# Patient Record
Sex: FEMALE | Race: WHITE | NOT HISPANIC OR LATINO | Employment: UNEMPLOYED | URBAN - NONMETROPOLITAN AREA
[De-identification: names, ages, dates, MRNs, and addresses within clinical notes are randomized per-mention and may not be internally consistent; named-entity substitution may affect disease eponyms.]

---

## 2022-08-17 ENCOUNTER — TELEPHONE (OUTPATIENT)
Dept: BARIATRICS/WEIGHT MGMT | Facility: CLINIC | Age: 27
End: 2022-08-17

## 2022-08-17 NOTE — TELEPHONE ENCOUNTER
Patient was called to remind them of their new patient appointment and to bring completed paperwork or arrive 30 minutes early. The patient was agreeable and voiced an understanding.       Patient has completed paperwork and [id number]

## 2022-08-18 ENCOUNTER — OFFICE VISIT (OUTPATIENT)
Dept: BARIATRICS/WEIGHT MGMT | Facility: CLINIC | Age: 27
End: 2022-08-18

## 2022-08-18 VITALS
HEART RATE: 71 BPM | OXYGEN SATURATION: 98 % | HEIGHT: 60 IN | SYSTOLIC BLOOD PRESSURE: 133 MMHG | DIASTOLIC BLOOD PRESSURE: 85 MMHG | TEMPERATURE: 98.6 F | WEIGHT: 269.4 LBS | BODY MASS INDEX: 52.89 KG/M2

## 2022-08-18 DIAGNOSIS — E66.01 CLASS 3 SEVERE OBESITY DUE TO EXCESS CALORIES WITH SERIOUS COMORBIDITY AND BODY MASS INDEX (BMI) OF 50.0 TO 59.9 IN ADULT: Primary | ICD-10-CM

## 2022-08-18 DIAGNOSIS — R12 HEARTBURN: ICD-10-CM

## 2022-08-18 PROCEDURE — 99204 OFFICE O/P NEW MOD 45 MIN: CPT | Performed by: NURSE PRACTITIONER

## 2022-08-18 RX ORDER — MULTIPLE VITAMINS W/ MINERALS TAB 9MG-400MCG
1 TAB ORAL DAILY
COMMUNITY
End: 2023-01-13

## 2022-08-18 RX ORDER — CALCIUM CARBONATE 200(500)MG
1 TABLET,CHEWABLE ORAL DAILY
COMMUNITY
End: 2023-01-13

## 2022-08-18 RX ORDER — FAMOTIDINE 10 MG
10 TABLET ORAL 2 TIMES DAILY
COMMUNITY
End: 2022-12-12

## 2022-08-18 NOTE — PROGRESS NOTES
Patient Care Team:  Lauren Riddle APRN as PCP - General (Family Medicine)      Subjective     Patient is a 26 y.o. female presents with morbid obesity and her Body mass index is 52.61 kg/m².     She is here for discussion of surgical weight loss options.  She stated she has been with the disease of obesity for year(s).  She stated she suffers from GERD, fatigue and morbid obesity due to her weight gain.  She stated that weight loss helps alleviate these symptoms.   She stated that she has tried other diet regimens such as KETO to help with weight loss.  She stated that she has attempted these conservative methods for weight loss without maintaining long term success.  Today she would like to discuss surgical weight loss options such as the Laparoscopic Sleeve Gastrectomy or the Laparoscopic R - Y Gastric Bypass.     She admits to struggling with her weight for at least 17 years.  She states that she has been around 200 pounds more overweight for the past 10 years.  She states that she was able to lose around 76 pounds by doing keto but was able to maintain that for about 3 months.    Review of Systems   Constitutional: Positive for fatigue.   Respiratory: Negative.    Cardiovascular: Negative.    Gastrointestinal: Negative.    Endocrine: Negative.    Musculoskeletal: Positive for arthralgias and back pain.   Psychiatric/Behavioral: Negative.         History  Past Medical History:   Diagnosis Date   • Heartburn       Past Surgical History:   Procedure Laterality Date   • BARTHOLIN GLAND CYST EXCISION  2018   • CHOLECYSTECTOMY     • SALPINGECTOMY Bilateral       Social History     Socioeconomic History   • Marital status:    Tobacco Use   • Smoking status: Never Smoker   • Smokeless tobacco: Never Used   Substance and Sexual Activity   • Alcohol use: Never      Family History   Problem Relation Age of Onset   • Hypertension Mother    • Diabetes Mother    • Sleep apnea Father    • No Known Problems  Sister    • SIDS Sister    • SIDS Sister    • No Known Problems Brother    • Obesity Maternal Grandmother    • Obesity Paternal Grandmother    • Obesity Paternal Grandfather    • Diabetes Brother       No Known Allergies       Current Outpatient Medications:   •  calcium carbonate (TUMS) 500 MG chewable tablet, Chew 1 tablet Daily., Disp: , Rfl:   •  famotidine (PEPCID) 10 MG tablet, Take 10 mg by mouth 2 (Two) Times a Day., Disp: , Rfl:   •  multivitamin with minerals (MULTIVITAMIN ADULT PO), Take 1 tablet by mouth Daily., Disp: , Rfl:     Objective     Vital Signs  Temp:  [98.6 °F (37 °C)] 98.6 °F (37 °C)  Heart Rate:  [71] 71  BP: (133)/(85) 133/85  Body mass index is 52.61 kg/m².      08/18/22  0858   Weight: 122 kg (269 lb 6.4 oz)       Physical Exam  Vitals reviewed.   Constitutional:       Appearance: She is obese.   Cardiovascular:      Rate and Rhythm: Normal rate and regular rhythm.   Pulmonary:      Effort: Pulmonary effort is normal.   Abdominal:      General: Bowel sounds are normal.      Palpations: Abdomen is soft.   Musculoskeletal:         General: Normal range of motion.   Skin:     General: Skin is warm and dry.   Neurological:      Mental Status: She is alert and oriented to person, place, and time.   Psychiatric:         Mood and Affect: Mood normal.         Behavior: Behavior normal.            Results Review:   I reviewed the patient's new clinical results.      Class 3 Severe Obesity (BMI >=40). Obesity-related health conditions include the following: fatigue and GERD. Obesity is unchanged. BMI is is above average; BMI management plan is completed. We discussed portion control and increasing exercise.      Assessment & Plan   Diagnoses and all orders for this visit:    1. Class 3 severe obesity due to excess calories with serious comorbidity and body mass index (BMI) of 50.0 to 59.9 in adult ([facility]) (Primary)  Comments:  Meal plan prescribed and reviewed with patient today.  Preoperative  work-up needs reviewed with patient today.  Disease of obesity explained to patien  Assessment & Plan:  Patient's (Body mass index is 52.61 kg/m².) indicates that they are morbidly obese (BMI > 40 or > 35 with obesity - related health condition) with health conditions that include GERD . Weight is improving with treatment. BMI is is above average; BMI management plan is completed. We discussed portion control and increasing exercise.       2. Heartburn  Comments:  Continue Pepcid.  EGD will be ordered at later date.  Nutritional counseling provided.        I discussed the patient's findings and my recommendations with patient.     I have also recommended that she obtain a cardiac risk assessment and psychiatric evaluation prior to surgery consideration.    She has been provided a structured dietary regimen based off of her behavior.  I discussed with the patient the etiology of the disease of obesity and the potential comorbid conditions associated with this disease.  She was instructed to follow the dietary regimen and follow-up with our program in 1 month's time with any additional questions as they may arise during this time.  We emphasized on focusing on proteins and meals high in fiber as well as adequate hydration that exceed 64 ounces of water daily.    I explained that I anticipate the patient to lose weight prior to her next monthly visit.  I encouraged patient to have a reward day once a month.    1. Patient is a 26 y.o. female who has morbid obesity with Body mass index is 52.61 kg/m². and desires surgical weight loss. Patient has been advised that this surgery is considered to be elective major surgery that is typically done laparoscopically. Patient is a potentially good surgical candidate. Patient will need to meet with the Bariatric Surgeon to further discuss surgical options. Patient has been advised that they will need to have a work-up prior to surgery. This work-up will include but is not limited  to an EKG, an EGD to assess for H. Pylori and Odom's esophagus, and a psychological evaluation, with additional testing as necessary. Pre-op testing will be ordered at next visit. Today the patient  received the 4 meals/day diet prescription, which was explained to patient.  Patient will see the dietitian today to further discuss goals for diet, exercise, and lifestyle. Patient has received intensive behavioral therapy for obesity today. I explained the pathophysiology of the disease and its storage component. We also discussed Dr. Dominique' pearls of the program. Nutrition counseling ordered today.     Pre-op testing:     Seminar - Completed  EGD - Needed, but not yet ordered  H. Pylori - Will be done with EGD   H. Pylori Stool -  N/A    Psychological Evaluation - Needed, but not yet ordered    EKG - Needed, but not yet ordered    Cardiology - If EKG abnormal, cardiology will be ordered     TSH - Needed, but not yet ordered  Nicotine - N/A    Pulmonary Clearance - N/A   Drug Tests - N/A    Dietitian - needs    A total of 45 minutes was spent reviewing documents, charting, educating and face to face with this patient and over half of the time was spent on counseling and coordination of care for the disease of obesity. We specifically reviewed the dietary prescription and I made recommendations toward increasing exercise as tolerated as well as focusing on training their behavior toward storing less.    JERROD Joel     08/18/22  13:01 CDT

## 2022-08-18 NOTE — ASSESSMENT & PLAN NOTE
Patient's (Body mass index is 52.61 kg/m².) indicates that they are morbidly obese (BMI > 40 or > 35 with obesity - related health condition) with health conditions that include GERD . Weight is improving with treatment. BMI is is above average; BMI management plan is completed. We discussed portion control and increasing exercise.

## 2022-08-22 ENCOUNTER — TELEPHONE (OUTPATIENT)
Dept: BARIATRICS/WEIGHT MGMT | Facility: CLINIC | Age: 27
End: 2022-08-22

## 2022-08-22 NOTE — TELEPHONE ENCOUNTER
Patient called with several questions regarding the prescription meal plan I answered many of her questions but Madeleine will call her later to answer her other questions.

## 2022-08-23 ENCOUNTER — PATIENT MESSAGE (OUTPATIENT)
Dept: BARIATRICS/WEIGHT MGMT | Facility: CLINIC | Age: 27
End: 2022-08-23

## 2022-08-23 ENCOUNTER — TELEPHONE (OUTPATIENT)
Dept: BARIATRICS/WEIGHT MGMT | Facility: CLINIC | Age: 27
End: 2022-08-23

## 2022-08-23 NOTE — TELEPHONE ENCOUNTER
Follow up call with Patient after her first visit. Reviewed meal plan and answered all Pt questions at this time. Encouraged her progress and advised her to continue to reach out if questions arise.

## 2022-09-19 ENCOUNTER — OFFICE VISIT (OUTPATIENT)
Dept: BARIATRICS/WEIGHT MGMT | Facility: CLINIC | Age: 27
End: 2022-09-19

## 2022-09-19 VITALS
HEIGHT: 60 IN | WEIGHT: 264.4 LBS | SYSTOLIC BLOOD PRESSURE: 126 MMHG | OXYGEN SATURATION: 94 % | DIASTOLIC BLOOD PRESSURE: 82 MMHG | BODY MASS INDEX: 51.91 KG/M2 | TEMPERATURE: 98 F | HEART RATE: 76 BPM

## 2022-09-19 DIAGNOSIS — R12 HEARTBURN: ICD-10-CM

## 2022-09-19 DIAGNOSIS — G47.10 HYPERSOMNIA: ICD-10-CM

## 2022-09-19 DIAGNOSIS — E66.01 CLASS 3 SEVERE OBESITY DUE TO EXCESS CALORIES WITH SERIOUS COMORBIDITY AND BODY MASS INDEX (BMI) OF 50.0 TO 59.9 IN ADULT: Primary | ICD-10-CM

## 2022-09-19 PROCEDURE — 99214 OFFICE O/P EST MOD 30 MIN: CPT | Performed by: NURSE PRACTITIONER

## 2022-09-19 NOTE — PROGRESS NOTES
"Metabolic and Bariatric Surgery Adult Nutrition Assessment    Patient Name: Jennifer Antnoio   YOB: 1995   MRN: [id number]     Assessment Date:  09/19/2022     Reason for Visit: Initial Nutrition Assessment     Treatment Pathway: Preoperative Bariatric Surgery, Visit 2    Assessment    Anthropometrics   Wt Readings from Last 1 Encounters:   09/19/22 120 kg (264 lb 6.4 oz)     Ht Readings from Last 1 Encounters:   09/19/22 152.4 cm (60\")     BMI Readings from Last 1 Encounters:   09/19/22 51.64 kg/m²        Initial Weight/Date: 269.4 lbs (Aug 2022)  Weight Changes since last visit: -5 lbs  Net Weight Change: -5 lbs    Past Medical History:   Diagnosis Date   • Heartburn       Past Surgical History:   Procedure Laterality Date   • BARTHOLIN GLAND CYST EXCISION  2018   • CHOLECYSTECTOMY     • SALPINGECTOMY Bilateral       Current Outpatient Medications   Medication Sig Dispense Refill   • calcium carbonate (TUMS) 500 MG chewable tablet Chew 1 tablet Daily.     • famotidine (PEPCID) 10 MG tablet Take 10 mg by mouth 2 (Two) Times a Day.     • multivitamin with minerals tablet tablet Take 1 tablet by mouth Daily.       No current facility-administered medications for this visit.      No Known Allergies       Motivation for weight loss includes: Wants to have energy and be able to do activities with children.     Pertinent Social/Behavior/Environmental History: Has three children (7, 5, and 15 months). Does get the opportunity to stay at home with children. Sometimes does meal prep and sometimes not.     Nutrition Recall  Eating 3-4 meals daily   (M1) Low carb tortilla with sausage/eggs and peppers/onions and 1 piece Khmer toast.   (M2) Salad (spinach), cucumbers, tuna, low-calorie/0 sugar dressing.   (M3) sometimes a protein shake w/o vegetables. OR nuts with cucumbers.   (M4) varies- depending on what making for family. Usually meat and vegetables. Stir frys a lot.   Snacking - yes, habit of snacking with " children. Goldfish/cassidy crackers.   Monitoring portions- is using measuring cups.   Calculating Protein- ~100 grams daily.   Drinking sugary/carbonated beverages- 1 soda daily. (was drinking several Energy drinks daily).   Fluid Intake- 74+ ounces daily. Water and Gatorade zero.       Success this Month: Weaning down on soda/caf.   Barriers: Cravings. Wanting to snack on leftovers while putting food away.     Exercise: Walking ~30 minutes daily down long driveway.   Recommended increasing physical activity, beyond normal daily habits, gradually working to reach ~30 minutes daily.     Nutrition Intervention  Nutrition education and nutrition coaching for behavior change provided.  Strategies used included Comprehensive education, Motivational Interviewing , Problem Solving, Skill Development for meal planning and Ongoing reinforcement  Review of medical weight loss prescription 4 meal/day plan and reviewed nutritional needs for Preoperative Bariatric Surgery, Visit 2.  Self-monitoring strategies such as keeping a food journal (on paper or electronically) and calculating fluid/protein intake were discussed.    Recommended Diet Changes  Eat 4 meals per day with protein and vegetables at each meal, no carbs after meal 2., Protein goal: 65gms., Eat vegetables first at each meal., Discussed protein guidelines for shakes and bars., Reduce snacking -use foods from free foods list only., Reduce fat, sugar, and/or salt in food choices., Choose more nutrient dense foods., Choose foods with increased fiber., Monitor portion sizes using a food scale and/or measuring cup., Eliminate soda and sugar-sweetened beverages and Increase fluid intake to 64 ounces per day      Goals  1. Put away leftovers/extra food before eating a meal.   2. Use only free foods/vegetables for snacking between meals.  3. Continue to wean carbonation/sugary drinks.     Monitoring/Evaluation Plan  Anticipate follow up per program protocol. Continue  collaboration of care with physician and treatment team.     Electronically signed by  Madeleine Welsh RDN, LD  09/19/2022 10:08 CDT.

## 2022-09-26 ENCOUNTER — HOSPITAL ENCOUNTER (OUTPATIENT)
Dept: CARDIOLOGY | Facility: HOSPITAL | Age: 27
Discharge: HOME | End: 2022-09-26
Admitting: NURSE PRACTITIONER

## 2022-09-26 DIAGNOSIS — E66.01 CLASS 3 SEVERE OBESITY DUE TO EXCESS CALORIES WITH SERIOUS COMORBIDITY AND BODY MASS INDEX (BMI) OF 50.0 TO 59.9 IN ADULT: ICD-10-CM

## 2022-09-26 PROCEDURE — 93010 ELECTROCARDIOGRAM REPORT: CPT | Performed by: INTERNAL MEDICINE

## 2022-09-26 PROCEDURE — 93005 ELECTROCARDIOGRAM TRACING: CPT | Performed by: NURSE PRACTITIONER

## 2022-09-27 LAB
QT INTERVAL: 412 MS
QTC INTERVAL: 441 MS

## 2022-10-17 ENCOUNTER — OFFICE VISIT (OUTPATIENT)
Dept: BARIATRICS/WEIGHT MGMT | Facility: CLINIC | Age: 27
End: 2022-10-17

## 2022-10-17 VITALS
SYSTOLIC BLOOD PRESSURE: 122 MMHG | HEIGHT: 60 IN | WEIGHT: 261 LBS | TEMPERATURE: 98.7 F | DIASTOLIC BLOOD PRESSURE: 84 MMHG | OXYGEN SATURATION: 97 % | HEART RATE: 82 BPM | BODY MASS INDEX: 51.24 KG/M2

## 2022-10-17 DIAGNOSIS — E66.01 CLASS 3 SEVERE OBESITY DUE TO EXCESS CALORIES WITH SERIOUS COMORBIDITY AND BODY MASS INDEX (BMI) OF 50.0 TO 59.9 IN ADULT: Primary | ICD-10-CM

## 2022-10-17 DIAGNOSIS — R12 HEARTBURN: ICD-10-CM

## 2022-10-17 PROCEDURE — 99214 OFFICE O/P EST MOD 30 MIN: CPT | Performed by: SURGERY

## 2022-10-17 NOTE — PROGRESS NOTES
Patient Care Team:  Lauren Riddle APRN as PCP - General (Family Medicine)    Reason for Visit:  Surgical Weight loss      Subjective     Jennifer Antonio is a pleasant 27 y.o. female and presents with morbid obesity with her Body mass index is 50.97 kg/m².    She is here for discussion of the upper endoscopic procedure.  She stated she has been with the disease of obesity for year(s).  She stated she suffers from heartburn and morbid obesity due to her weight gain.  She stated that weight loss helps alleviate these symptoms.   She stated that she has tried multiple diet regimens including participating in a medically supervised weight loss program to help with weight loss.  She stated that she has attempted these conservative methods for weight loss without maintaining long term success.  Today sheand myself will discuss surgical weight loss options such as the Laparoscopic Sleeve Gastrectomy or the Laparoscopic R - Y Gastric Bypass.        Review of Systems  General ROS: negative  Respiratory ROS: no cough, shortness of breath, or wheezing  Cardiovascular ROS: no chest pain or dyspnea on exertion  Gastrointestinal ROS: no abdominal pain, change in bowel habits, or black or bloody stools    History  Past Medical History:   Diagnosis Date   • Heartburn      Past Surgical History:   Procedure Laterality Date   • BARTHOLIN GLAND CYST EXCISION  2018   • CHOLECYSTECTOMY     • SALPINGECTOMY Bilateral      Family History   Problem Relation Age of Onset   • Hypertension Mother    • Diabetes Mother    • Sleep apnea Father    • No Known Problems Sister    • SIDS Sister    • SIDS Sister    • No Known Problems Brother    • Obesity Maternal Grandmother    • Obesity Paternal Grandmother    • Obesity Paternal Grandfather    • Diabetes Brother      Social History     Tobacco Use   • Smoking status: Never   • Smokeless tobacco: Never   Substance Use Topics   • Alcohol use: Never     E-cigarette/Vaping      E-cigarette/Vaping Substances     (Not in a hospital admission)    Allergies:  Patient has no known allergies.      Current Outpatient Medications:   •  calcium carbonate (TUMS) 500 MG chewable tablet, Chew 1 tablet Daily., Disp: , Rfl:   •  famotidine (PEPCID) 10 MG tablet, Take 10 mg by mouth 2 (Two) Times a Day., Disp: , Rfl:   •  multivitamin with minerals tablet tablet, Take 1 tablet by mouth Daily., Disp: , Rfl:     Objective     Vital Signs  Temp:  [98.7 °F (37.1 °C)] 98.7 °F (37.1 °C)  Heart Rate:  [82] 82  BP: (122)/(84) 122/84  Body mass index is 50.97 kg/m².      10/17/22  1306   Weight: 118 kg (261 lb)       Physical Exam:      HEENT: extra ocular movement intact and sclera clear, anicteric  Respiratory: appears well, vitals normal, no respiratory distress, acyanotic, normal RR, chest clear, no wheezing, crepitations, rhonchi, normal symmetric air entry  Cardiovascular: Regular rate and rhythm, S1, S2 normal, no murmur, click, rub or gallop  GI: Soft, non-tender, normal bowel sounds; no bruits, organomegaly or masses.  Abnormal shape: obese  Musculoskeletal: inspection - no abnormality  Neurologic: alert, oriented, normal speech, no focal findings or movement disorder noted       Results Review:   None        Assessment & Plan   Encounter Diagnoses   Name Primary?   • Class 3 severe obesity due to excess calories with serious comorbidity and body mass index (BMI) of 50.0 to 59.9 in adult ([facility]CC) Yes   • Heartburn            1.  I believe this patient will be a good candidate for weight loss surgery.  I have discussed the Maya - Y Gastric Bypass, laparoscopic sleeve gastrectomy and the Laparoscopic Gastric Band procedures.  We discussed the benefits of the surgeries including the benefit of weight loss and the possible reversal of co-morbid conditions associated with morbid obesity. I explained to the patient that prior to making a definitive decision on the type of surgery she will require an  esophagogastroduodenoscopy with biopsies to assess for any contraindications for surgical weight loss.  I explained the alternatives  include not doing anything, or pursuing an UGI series which only offers a diagnosis with potential less accuracy compared to EGD, the benefits of the EGD such as identifying the pathology and anatomy of the upper GI system, and the risks and complications of the endoscopy were discussed in detail as well. I discussed the risk of perforation (one out of [id number], riskier with dilation), bleeding (one out of 500), and the rare risks of infection, adverse reaction to anesthesia, respiratory failure, cardiac failure including MI and adverse reaction to medications such as allergic reactions. We discussed consequences that could occur if a risk were to develop such as the need for hospitalization, blood transfusion, surgical intervention, medications, pain, disability and death. The patient verbalizes understanding and agrees to proceed. such as bleeding, perforation, swallowing difficulties and gas bloat can occur after this procedure.    Upon completion of our discussion and addressing and answering her questions to her satisfation, informed consent was obtained.  She will be scheduled accordingly for the esophagogastroduodenoscopy procedure.    I discussed the patients findings and my recommendations with patient.     Dr. Eloy Dominique MD [facility]    10/17/22  13:09 CDT  Patient Care Team:  Lauren Riddle APRN as PCP - General (Family Medicine)

## 2022-10-24 ENCOUNTER — HOSPITAL ENCOUNTER (OUTPATIENT)
Dept: SLEEP MEDICINE | Facility: HOSPITAL | Age: 27
Discharge: HOME | End: 2022-10-24
Admitting: NURSE PRACTITIONER

## 2022-10-24 DIAGNOSIS — G47.10 HYPERSOMNIA: ICD-10-CM

## 2022-10-24 DIAGNOSIS — E66.01 CLASS 3 SEVERE OBESITY DUE TO EXCESS CALORIES WITH SERIOUS COMORBIDITY AND BODY MASS INDEX (BMI) OF 50.0 TO 59.9 IN ADULT: ICD-10-CM

## 2022-10-24 PROCEDURE — 95800 SLP STDY UNATTENDED: CPT | Performed by: INTERNAL MEDICINE

## 2022-10-24 PROCEDURE — 95800 SLP STDY UNATTENDED: CPT

## 2022-11-02 ENCOUNTER — TELEPHONE (OUTPATIENT)
Dept: BARIATRICS/WEIGHT MGMT | Facility: CLINIC | Age: 27
End: 2022-11-02

## 2022-11-02 NOTE — TELEPHONE ENCOUNTER
BA-EGD       Patient was called and reminded of their procedure with Dr Dominique on 11/04/2022        Instructions:     1) Eat normal the day before your procedure until 8 p.m. in the evening.    2) Beginning at 8pm clear liquids only-no Red or Pink in Color   3) Nothing to eat or drink after midnight.  4) Bring a list of medications.   5) Advised that they must bring a  as that IV sedation is being used.           The patient voiced an understanding and was agreeable.

## 2022-11-04 ENCOUNTER — ANESTHESIA EVENT (OUTPATIENT)
Dept: GASTROENTEROLOGY | Facility: HOSPITAL | Age: 27
End: 2022-11-04

## 2022-11-04 ENCOUNTER — HOSPITAL ENCOUNTER (OUTPATIENT)
Facility: HOSPITAL | Age: 27
Setting detail: HOSPITAL OUTPATIENT SURGERY
Discharge: HOME | End: 2022-11-04
Attending: SURGERY | Admitting: SURGERY

## 2022-11-04 ENCOUNTER — ANESTHESIA (OUTPATIENT)
Dept: GASTROENTEROLOGY | Facility: HOSPITAL | Age: 27
End: 2022-11-04

## 2022-11-04 VITALS
BODY MASS INDEX: 51.24 KG/M2 | RESPIRATION RATE: 22 BRPM | WEIGHT: 261 LBS | OXYGEN SATURATION: 97 % | HEART RATE: 75 BPM | TEMPERATURE: 97.4 F | HEIGHT: 60 IN | SYSTOLIC BLOOD PRESSURE: 105 MMHG | DIASTOLIC BLOOD PRESSURE: 68 MMHG

## 2022-11-04 DIAGNOSIS — E66.01 CLASS 3 SEVERE OBESITY DUE TO EXCESS CALORIES WITH SERIOUS COMORBIDITY AND BODY MASS INDEX (BMI) OF 50.0 TO 59.9 IN ADULT: ICD-10-CM

## 2022-11-04 DIAGNOSIS — R12 HEARTBURN: ICD-10-CM

## 2022-11-04 LAB — B-HCG UR QL: NEGATIVE

## 2022-11-04 PROCEDURE — 81025 URINE PREGNANCY TEST: CPT | Performed by: ANESTHESIOLOGY

## 2022-11-04 PROCEDURE — 87081 CULTURE SCREEN ONLY: CPT | Performed by: SURGERY

## 2022-11-04 PROCEDURE — 25010000002 PROPOFOL 10 MG/ML EMULSION: Performed by: NURSE ANESTHETIST, CERTIFIED REGISTERED

## 2022-11-04 RX ORDER — SODIUM CHLORIDE 9 MG/ML
500 INJECTION, SOLUTION INTRAVENOUS CONTINUOUS PRN
Status: DISCONTINUED | OUTPATIENT
Start: 2022-11-04 | End: 2022-11-04 | Stop reason: HOSPADM

## 2022-11-04 RX ORDER — LIDOCAINE HYDROCHLORIDE 10 MG/ML
0.5 INJECTION, SOLUTION EPIDURAL; INFILTRATION; INTRACAUDAL; PERINEURAL ONCE AS NEEDED
Status: CANCELLED | OUTPATIENT
Start: 2022-11-04

## 2022-11-04 RX ORDER — LIDOCAINE HYDROCHLORIDE 20 MG/ML
INJECTION, SOLUTION EPIDURAL; INFILTRATION; INTRACAUDAL; PERINEURAL AS NEEDED
Status: DISCONTINUED | OUTPATIENT
Start: 2022-11-04 | End: 2022-11-04 | Stop reason: SURG

## 2022-11-04 RX ORDER — PROPOFOL 10 MG/ML
VIAL (ML) INTRAVENOUS AS NEEDED
Status: DISCONTINUED | OUTPATIENT
Start: 2022-11-04 | End: 2022-11-04 | Stop reason: SURG

## 2022-11-04 RX ORDER — SODIUM CHLORIDE 0.9 % (FLUSH) 0.9 %
10 SYRINGE (ML) INJECTION AS NEEDED
Status: DISCONTINUED | OUTPATIENT
Start: 2022-11-04 | End: 2022-11-04 | Stop reason: HOSPADM

## 2022-11-04 RX ADMIN — SODIUM CHLORIDE 500 ML: 9 INJECTION, SOLUTION INTRAVENOUS at 10:49

## 2022-11-04 RX ADMIN — PROPOFOL 50 MG: 10 INJECTION, EMULSION INTRAVENOUS at 11:26

## 2022-11-04 RX ADMIN — PROPOFOL 50 MG: 10 INJECTION, EMULSION INTRAVENOUS at 11:27

## 2022-11-04 RX ADMIN — LIDOCAINE HYDROCHLORIDE 100 MG: 20 INJECTION, SOLUTION EPIDURAL; INFILTRATION; INTRACAUDAL; PERINEURAL at 11:25

## 2022-11-04 RX ADMIN — PROPOFOL 100 MG: 10 INJECTION, EMULSION INTRAVENOUS at 11:25

## 2022-11-04 NOTE — ANESTHESIA POSTPROCEDURE EVALUATION
"Patient: Jennifer Antonio    Procedure Summary     Date: 11/04/22 Room / Location: [facility] ENDOSCOPY 2 / [facility] END[facility]    Anesthesia Start: 1124 Anesthesia Stop: 1134    Procedure: ESOPHAGOGASTRODUODENOSCOPY WITH ANESTHESIA Diagnosis:       Class 3 severe obesity due to excess calories with serious comorbidity and body mass index (BMI) of 50.0 to 59.9 in adult ([facility])      Heartburn      (Class 3 severe obesity due to excess calories with serious comorbidity and body mass index (BMI) of 50.0 to 59.9 in adult (HCC) [E66.01, Z68.43])      (Heartburn [R12])    Surgeons: Eloy Dominique MD Provider: Anibal Lewis CRNA    Anesthesia Type: MAC ASA Status: 3          Anesthesia Type: MAC    Vitals  Vitals Value Taken Time   /69 11/04/22 1146   Temp     Pulse 73 11/04/22 1147   Resp 25 11/04/22 1135   SpO2 98 % 11/04/22 1147   Vitals shown include unvalidated device data.        Post Anesthesia Care and Evaluation    Patient location during evaluation: PACU  Patient participation: complete - patient participated  Level of consciousness: awake and alert  Pain management: adequate    Airway patency: patent  Anesthetic complications: No anesthetic complications    Cardiovascular status: acceptable  Respiratory status: acceptable  Hydration status: acceptable    Comments: Blood pressure 101/57, pulse 65, temperature 97.4 °F (36.3 °C), temperature source Temporal, resp. rate 25, height 152.4 cm (60\"), weight 118 kg (261 lb), SpO2 92 %.    Pt discharged from PACU based on nicole score >8      "

## 2022-11-04 NOTE — ANESTHESIA PREPROCEDURE EVALUATION
Anesthesia Evaluation     Patient summary reviewed   no history of anesthetic complications:  NPO Solid Status: > 8 hours             Airway   Mallampati: II  Dental      Pulmonary - negative pulmonary ROS   Cardiovascular - negative cardio ROS  Exercise tolerance: excellent (>7 METS)        Neuro/Psych- negative ROS  GI/Hepatic/Renal/Endo    (+) morbid obesity, GERD,      Musculoskeletal     Abdominal    Substance History      OB/GYN          Other                        Anesthesia Plan    ASA 3     MAC       Anesthetic plan, risks, benefits, and alternatives have been provided, discussed and informed consent has been obtained with: patient.        CODE STATUS:

## 2022-11-05 LAB — UREASE TISS QL: NEGATIVE

## 2022-11-16 ENCOUNTER — OFFICE VISIT (OUTPATIENT)
Dept: BARIATRICS/WEIGHT MGMT | Facility: CLINIC | Age: 27
End: 2022-11-16

## 2022-11-16 VITALS
TEMPERATURE: 97.7 F | HEIGHT: 60 IN | OXYGEN SATURATION: 97 % | BODY MASS INDEX: 51.04 KG/M2 | WEIGHT: 260 LBS | HEART RATE: 85 BPM | DIASTOLIC BLOOD PRESSURE: 80 MMHG | SYSTOLIC BLOOD PRESSURE: 123 MMHG

## 2022-11-16 DIAGNOSIS — R12 HEARTBURN: ICD-10-CM

## 2022-11-16 DIAGNOSIS — E66.01 CLASS 3 SEVERE OBESITY DUE TO EXCESS CALORIES WITH SERIOUS COMORBIDITY AND BODY MASS INDEX (BMI) OF 50.0 TO 59.9 IN ADULT: Primary | ICD-10-CM

## 2022-11-16 DIAGNOSIS — G47.10 HYPERSOMNIA: ICD-10-CM

## 2022-11-16 PROCEDURE — 99213 OFFICE O/P EST LOW 20 MIN: CPT | Performed by: NURSE PRACTITIONER

## 2022-11-16 NOTE — PROGRESS NOTES
"Patient Care Team:  Lauren Riddle APRN as PCP - General (Family Medicine)    Reason for Visit:  Surgical Weight loss, V4    Subjective   Jennifer Antonio is a 27 y.o. female.     Jennifer is here for follow-up and continued medical management of her morbid obesity.  She is currently on a prescription diet.  Jennifer previously was to apply dietary changes such as following the meal plan as directed.  She admits to eating 4 meals per day.  As a result she lstost weight since her last visit.  She has lost 1 lb since her last appointment with us. She states this past month has been difficult for her due to finances.     Review Of Systems:  Review of Systems   Constitutional: Positive for fatigue.   Respiratory: Negative.    Cardiovascular: Negative.    Gastrointestinal: Negative.    Endocrine: Negative.    Musculoskeletal: Positive for arthralgias and myalgias.   Psychiatric/Behavioral: Negative.          The following portions of the patient's history were reviewed and updated as appropriate: allergies, current medications, past family history, past medical history, past social history, past surgical history, and problem list.    Objective   /80 (BP Location: Right arm, Patient Position: Sitting, Cuff Size: Adult)   Pulse 85   Temp 97.7 °F (36.5 °C)   Ht 152.4 cm (60\")   Wt 118 kg (260 lb)   SpO2 97%   BMI 50.78 kg/m²       11/16/22  1338   Weight: 118 kg (260 lb)       Physical Exam  Vitals reviewed.   Constitutional:       Appearance: She is obese.   Cardiovascular:      Rate and Rhythm: Normal rate and regular rhythm.   Pulmonary:      Effort: Pulmonary effort is normal.   Musculoskeletal:         General: Normal range of motion.   Skin:     General: Skin is warm and dry.   Neurological:      Mental Status: She is alert and oriented to person, place, and time.   Psychiatric:         Mood and Affect: Mood normal.         Behavior: Behavior normal.         [facility]ase For H Pylori - Tissue, Stomach " (11/04/2022 10:47)  UPPER GI ENDOSCOPY (11/04/2022 11:26)  Home Sleep Study (10/25/2022 09:49)  ECG 12 Lead (09/26/2022 08:12)    Class 3 Severe Obesity (BMI >=40). Obesity-related health conditions include the following: GERD. Obesity is improving with treatment. BMI is is above average; BMI management plan is completed. We discussed portion control and increasing exercise.     Assessment & Plan   Diagnoses and all orders for this visit:    1. Class 3 severe obesity due to excess calories with serious comorbidity and body mass index (BMI) of 50.0 to 59.9 in adult (HCC) (Primary)  Assessment & Plan:  Patient's (Body mass index is 50.78 kg/m².) indicates that they are morbidly obese (BMI > 40 or > 35 with obesity - related health condition) with health conditions that include GERD . Weight is improving with treatment. BMI is is above average; BMI management plan is completed. We discussed portion control and increasing exercise.       2. Heartburn  Comments:  States reflux is well controlled     3. Hypersomnia       Jennifer Antonio was seen today for follow-up, obesity, nutrition counseling and weight loss.  She has lost weight since her last visit.  Today we discussed healthy changes in lifestyle, diet, and exercise. Dietician consultation obtained.  Jennifer Antonio had received handouts to her explaining the recommendation on portion sizes/appetite control/reading nutrition labels.   Intensive behavioral therapy for obesity was done today as well.     Goals for this month are:   1. Increase vegetable intake   2. All preoperative workup is complete once we obtain the psychiatric letter.     Follow up in one month for a weight recheck.

## 2022-11-16 NOTE — ASSESSMENT & PLAN NOTE
Patient's (Body mass index is 50.78 kg/m².) indicates that they are morbidly obese (BMI > 40 or > 35 with obesity - related health condition) with health conditions that include GERD . Weight is improving with treatment. BMI is is above average; BMI management plan is completed. We discussed portion control and increasing exercise.

## 2022-12-06 ENCOUNTER — TELEPHONE (OUTPATIENT)
Dept: BARIATRICS/WEIGHT MGMT | Facility: CLINIC | Age: 27
End: 2022-12-06

## 2022-12-06 NOTE — TELEPHONE ENCOUNTER
"Patient wanted to inform she having heartburn and \"squeezing pain\". She is being treated by her PCP for esophagitis.   "

## 2022-12-12 ENCOUNTER — OFFICE VISIT (OUTPATIENT)
Dept: BARIATRICS/WEIGHT MGMT | Facility: CLINIC | Age: 27
End: 2022-12-12

## 2022-12-12 VITALS
BODY MASS INDEX: 49.24 KG/M2 | SYSTOLIC BLOOD PRESSURE: 103 MMHG | HEIGHT: 60 IN | DIASTOLIC BLOOD PRESSURE: 73 MMHG | TEMPERATURE: 98.4 F | HEART RATE: 94 BPM | WEIGHT: 250.8 LBS | OXYGEN SATURATION: 98 %

## 2022-12-12 DIAGNOSIS — E66.01 CLASS 3 SEVERE OBESITY DUE TO EXCESS CALORIES WITH SERIOUS COMORBIDITY AND BODY MASS INDEX (BMI) OF 50.0 TO 59.9 IN ADULT: Primary | ICD-10-CM

## 2022-12-12 DIAGNOSIS — R12 HEARTBURN: ICD-10-CM

## 2022-12-12 PROCEDURE — 99214 OFFICE O/P EST MOD 30 MIN: CPT | Performed by: SURGERY

## 2022-12-12 RX ORDER — ONDANSETRON 2 MG/ML
4 INJECTION INTRAMUSCULAR; INTRAVENOUS EVERY 6 HOURS PRN
Status: CANCELLED | OUTPATIENT
Start: 2022-12-12

## 2022-12-12 RX ORDER — GABAPENTIN 250 MG/5ML
250 SOLUTION ORAL ONCE
Status: CANCELLED | OUTPATIENT
Start: 2022-12-12 | End: 2022-12-12

## 2022-12-12 RX ORDER — SCOLOPAMINE TRANSDERMAL SYSTEM 1 MG/1
1 PATCH, EXTENDED RELEASE TRANSDERMAL CONTINUOUS
Status: CANCELLED | OUTPATIENT
Start: 2022-12-12 | End: 2022-12-15

## 2022-12-12 RX ORDER — PANTOPRAZOLE SODIUM 40 MG/1
40 TABLET, DELAYED RELEASE ORAL DAILY
COMMUNITY
Start: 2022-12-05

## 2022-12-12 RX ORDER — SUCRALFATE 1 G/1
1 TABLET ORAL 4 TIMES DAILY
COMMUNITY
Start: 2022-12-11 | End: 2023-01-13

## 2022-12-12 RX ORDER — ENOXAPARIN SODIUM 150 MG/ML
40 INJECTION SUBCUTANEOUS ONCE
Status: CANCELLED | OUTPATIENT
Start: 2022-12-12 | End: 2022-12-12

## 2022-12-12 NOTE — PROGRESS NOTES
"Metabolic and Bariatric Surgery Adult Nutrition Assessment    Patient Name: Jennifer Antonio   YOB: 1995   MRN: [id number]     Assessment Date:  12/12/2022     Reason for Visit: Follow-up Nutrition Assessment     Treatment Pathway: Preoperative Bariatric Surgery, Visit 5    Assessment    Anthropometrics   Wt Readings from Last 1 Encounters:   12/12/22 114 kg (250 lb 12.8 oz)     Ht Readings from Last 1 Encounters:   12/12/22 152.4 cm (60\")     BMI Readings from Last 1 Encounters:   12/12/22 48.98 kg/m²        Initial Weight/Date: 269.4 lbs (Aug 2022)  Weight Changes since last visit: -9.2 lbs  Net Weight Change: -18.6 lbs    Past Medical History:   Diagnosis Date   • GERD (gastroesophageal reflux disease)    • Heartburn       Past Surgical History:   Procedure Laterality Date   • BARTHOLIN GLAND CYST EXCISION  2018   • CHOLECYSTECTOMY     • ENDOSCOPY N/A 11/04/2022    Procedure: ESOPHAGOGASTRODUODENOSCOPY WITH ANESTHESIA;  Surgeon: Eloy Dominique MD;  Location: [facility] ENDOSCOPY;  Service: General;  Laterality: N/A;  pre heartburn  post normal  Lauren Jewel   • SALPINGECTOMY Bilateral       Current Outpatient Medications   Medication Sig Dispense Refill   • calcium carbonate (TUMS) 500 MG chewable tablet Chew 1 tablet Daily.     • multivitamin with minerals tablet tablet Take 1 tablet by mouth Daily.     • pantoprazole (PROTONIX) 40 MG EC tablet Take 40 mg by mouth Daily.     • sucralfate (CARAFATE) 1 g tablet PRN       No current facility-administered medications for this visit.      No Known Allergies         Nutrition Recall  Eating 4 meals daily   Snacking - francis, ketan,   Monitoring portions- Measuring cups, sometimes adding extra vegetables.   Calculating Protein- ~100 gm daily.  Drinking sugary/carbonated beverages- None  Fluid Intake- ~72 oz/d    Success this Month: reduced hunger and didn't make self miserable at Thanksgiving overeating   Barriers: snacking out of boredom. "     Exercise: walking with children.   Recommended increasing physical activity, beyond normal daily habits, gradually working to reach ~30 minutes daily.     Nutrition Intervention  Nutrition education and nutrition coaching for behavior change provided.  Strategies used included Comprehensive education, Motivational Interviewing , Problem Solving and Ongoing reinforcement  Review of medical weight loss prescription 4 meal/day plan and reviewed nutritional needs for Preoperative Bariatric Surgery, Visit 5.  Self-monitoring strategies such as keeping a food journal (on paper or electronically) and calculating fluid/protein intake were discussed.    Recommended Diet Changes  Continue Medical Weight Loss Prescription plan at this time; transition to Liver Shrinking Diet 14 days prior to surgery. Reviewed LSD guidelines and meal adjustments      Goals  1. Eliminate boredom snacking. -Identify an activity to do when bored and wanting to snack.       Monitoring/Evaluation Plan  Anticipate follow up per program protocol. Continue collaboration of care with physician and treatment team.     Electronically signed by  Madeleine Welsh RDN, LD  12/12/2022 14:24 CST.

## 2022-12-12 NOTE — PROGRESS NOTES
Patient Care Team:  Lauren Riddle APRN as PCP - General (Family Medicine)    Reason for Visit:  Surgical Weight loss    Subjective      Jennifer Antonio is a pleasant 27 y.o. female and presents with morbid obesity with her Body mass index is 48.98 kg/m².    She is here for discussion of weight loss options.  She stated she has been with the disease of obesity for year(s).  She stated she suffers from heartburn and morbid obesity due to her weight gain.  She stated that weight loss helps alleviate these symptoms.   She stated that she has tried multiple diet regimens to help with weight loss.  She stated that she has attempted these conservative methods for weight loss without maintaining long term success.  Today she would like to discuss surgical weight loss options such as the Laparoscopic Sleeve Gastrectomy or the Laparoscopic R - Y Gastric Bypass.      Review of Systems  General ROS: negative  Respiratory ROS: no cough, shortness of breath, or wheezing  Cardiovascular ROS: no chest pain or dyspnea on exertion  Gastrointestinal ROS: no abdominal pain, change in bowel habits, or black or bloody stools  positive for - heartburn  Musculoskeletal ROS: positive for - joint pain    History  Past Medical History:   Diagnosis Date   • GERD (gastroesophageal reflux disease)    • Heartburn      Past Surgical History:   Procedure Laterality Date   • BARTHOLIN GLAND CYST EXCISION  2018   • CHOLECYSTECTOMY     • ENDOSCOPY N/A 11/04/2022    Procedure: ESOPHAGOGASTRODUODENOSCOPY WITH ANESTHESIA;  Surgeon: Eloy Dominique MD;  Location: [facility] ENDOSCOPY;  Service: General;  Laterality: N/A;  pre heartburn  post normal  Lauren Aggarwal   • SALPINGECTOMY Bilateral      Family History   Problem Relation Age of Onset   • Hypertension Mother    • Diabetes Mother    • Sleep apnea Father    • No Known Problems Sister    • SIDS Sister    • SIDS Sister    • No Known Problems Brother    • Obesity Maternal Grandmother    •  Obesity Paternal Grandmother    • Obesity Paternal Grandfather    • Diabetes Brother      Social History     Tobacco Use   • Smoking status: Never   • Smokeless tobacco: Never   Substance Use Topics   • Alcohol use: Never   • Drug use: Not Currently     E-cigarette/Vaping     E-cigarette/Vaping Substances     (Not in a hospital admission)    Allergies:  Patient has no known allergies.      Current Outpatient Medications:   •  calcium carbonate (TUMS) 500 MG chewable tablet, Chew 1 tablet Daily., Disp: , Rfl:   •  multivitamin with minerals tablet tablet, Take 1 tablet by mouth Daily., Disp: , Rfl:   •  pantoprazole (PROTONIX) 40 MG EC tablet, Take 40 mg by mouth Daily., Disp: , Rfl:   •  sucralfate (CARAFATE) 1 g tablet, PRN, Disp: , Rfl:     Objective     Vital Signs  Temp:  [98.4 °F (36.9 °C)] 98.4 °F (36.9 °C)  Heart Rate:  [94] 94  BP: (103)/(73) 103/73  Body mass index is 48.98 kg/m².      12/12/22  1316   Weight: 114 kg (250 lb 12.8 oz)       General Appearance:  awake, alert, oriented, in no acute distress  Lungs:  Normal expansion.  Clear to auscultation.  No rales, rhonchi, or wheezing.  Heart:  Heart regular rate and rhythm  Abdomen:  Soft, non-tender, normal bowel sounds; no bruits, organomegaly or masses.  Abnormal shape: obese  Extremities: Extremities warm to touch, pink, with no edema.      Results Review:   I reviewed the patient's new clinical results.        Assessment & Plan   Encounter Diagnoses   Name Primary?   • Class 3 severe obesity due to excess calories with serious comorbidity and body mass index (BMI) of 50.0 to 59.9 in adult (HCC) Yes   • Heartburn        I believe this patient will be a good candidate for weight loss surgery.    She has chosen laparoscopic sleeve gastrectomy. I agree with this decision.  I have discussed the Maya - Y Gastric Bypass, laparoscopic sleeve gastrectomy and the Laparoscopic Gastric Band procedures to provide the alternatives which includes non surgical  weight loss options as well.  We discussed the benefits of the surgeries including the benefit of weight loss and the possible reversal of co-morbid conditions associated with morbid obesity.  She is aware that the Sleeve procedure is not reversible.  We discussed the complications and risks which include the risk of perforation, leakage,bleeding, intra-abdominal organ injury, specifically at high risks of the liver and spleen, stenosis or ulcerations, the risk of venous thrombosis formation in the lungs, mesentery veins or lower extremities  leading to possible organ injury and death.  I explained to the patient that there is a risk of infection.  I explained to her the possibility that this procedure may not be performed laparoscopically and may require being converted to an opened procedure or aborted due to abnormal anatomy.  Also postoperatively there is a risk of increased GERD symptoms after this procedure.  I have explained if she develops intestinal metaplasia of her esophagus consideration for conversion to another weight loss procedure may be necessary.    I have explained to the patient that depression, addictive behaviors and even an increase in suicidal emotions can occur after surgery and even though they have undergone a mental health evaluation through psychology/psychiatry or by a registered therapist she may require additional evaluation and treatment in the future.  Nicotine cessation needs to continue even after their surgical procedure and nicotine products should be avoided due to the side effects of these products.  I explained to Jennifer Antonio not to plan for pregnancy within 12 to 18 months of her procedure.  I explained to her postoperatively she should avoid having unprotected sex that would increase her risk of pregnancy during the postoperative period of 1 to 1-1/2 years.      I have reviewed with the patient her 30-day mortality risk using the ACS Surgical Risk Calculator to be  below average of 0.1%.    I explained to the patient that the success of the surgery is directly related to the motivation and dedication to behavioral changes that they have learned during their preoperative course.  There is data that shows approximately 10% of patients may have satisfactory weight loss or regain their weight they have lost after surgery.  It is more likely due to returning to the previous behaviors they incorporated during their disease of obesity.  I also encourage Jennifer Antonio to incorporate exercise again after surgery weight restrictions have been removed postoperatively.    Heron Report   As part of this patient's treatment plan I am prescribing controlled substances.  We review Heron in our educational course.  The patient has been made aware of appropriate use of such medications, including potential risk of somnolence, limited ability to drive and /or work safely, and potential for dependence or overdose. It has also been made clear that these medications are for use by this patient only, without concomitant use of alcohol or other substances unless prescribed.    Jennifer RODRIGO Paco will be required to complete the educational preoperative class detailing terms of the preoperative and postoperative recommendations, risks of surgery, the monitoring of the patient's HERON reports if necessary. Jennifer Antonio is aware that inappropriate use of prescribed medications will result in cessation of prescribing such medications.    HERON report has been reviewed.      History and physical exam exhibit continued safe and appropriate use of controlled substances.       Jennifer Antonio understands the surgical procedures and the different surgical options that are available.   Jennifer RODRIGO Paco understands the lifestyle changes that are required after surgery and has agreed to follow the guidelines outlined in the weight management program. Jennifer Antonio also expressed understanding of the  risks involved and had all of her questions answered and desires to proceed.    Upon completion of our discussion and addressing and answering her questions to her satisfaction, informed consent was obtained.   She will be scheduled accordingly for a laparoscopic Sleeve gastrectomy procedure.      I discussed the patient's findings and my recommendations with patient.     I have also recommended that she obtain completion of her preoperative educational course, diet regimen and laboratory work prior to surgery consideration.      Dr. Eloy Dominique MD [facility]    12/12/22  13:50 CST  Patient Care Team:  Lauren Riddle APRN as PCP - General (Family Medicine)

## 2023-01-06 ENCOUNTER — TREATMENT (OUTPATIENT)
Dept: BARIATRICS/WEIGHT MGMT | Facility: CLINIC | Age: 28
End: 2023-01-06
Payer: MEDICAID

## 2023-01-06 ENCOUNTER — LAB (OUTPATIENT)
Dept: LAB | Facility: HOSPITAL | Age: 28
End: 2023-01-06
Payer: MEDICAID

## 2023-01-06 VITALS — WEIGHT: 245.4 LBS | BODY MASS INDEX: 48.18 KG/M2 | HEIGHT: 60 IN

## 2023-01-06 DIAGNOSIS — E66.01 CLASS 3 SEVERE OBESITY DUE TO EXCESS CALORIES WITH SERIOUS COMORBIDITY AND BODY MASS INDEX (BMI) OF 50.0 TO 59.9 IN ADULT: ICD-10-CM

## 2023-01-06 LAB
ALBUMIN SERPL-MCNC: 4 G/DL (ref 3.5–5.2)
ALBUMIN/GLOB SERPL: 1.2 G/DL
ALP SERPL-CCNC: 87 U/L (ref 39–117)
ALT SERPL W P-5'-P-CCNC: 37 U/L (ref 1–33)
ANION GAP SERPL CALCULATED.3IONS-SCNC: 14 MMOL/L (ref 5–15)
APTT PPP: 30.3 SECONDS (ref 24.1–35)
AST SERPL-CCNC: 31 U/L (ref 1–32)
BILIRUB SERPL-MCNC: 0.2 MG/DL (ref 0–1.2)
BILIRUB UR QL STRIP: NEGATIVE
BUN SERPL-MCNC: 12 MG/DL (ref 6–20)
BUN/CREAT SERPL: 20.7 (ref 7–25)
CALCIUM SPEC-SCNC: 8.9 MG/DL (ref 8.6–10.5)
CHLORIDE SERPL-SCNC: 97 MMOL/L (ref 98–107)
CLARITY UR: ABNORMAL
CO2 SERPL-SCNC: 24 MMOL/L (ref 22–29)
COLOR UR: YELLOW
CREAT SERPL-MCNC: 0.58 MG/DL (ref 0.57–1)
DEPRECATED RDW RBC AUTO: 46.3 FL (ref 37–54)
EGFRCR SERPLBLD CKD-EPI 2021: 128.2 ML/MIN/1.73
ERYTHROCYTE [DISTWIDTH] IN BLOOD BY AUTOMATED COUNT: 15.8 % (ref 12.3–15.4)
GLOBULIN UR ELPH-MCNC: 3.3 GM/DL
GLUCOSE SERPL-MCNC: 78 MG/DL (ref 65–99)
GLUCOSE UR STRIP-MCNC: NEGATIVE MG/DL
HCT VFR BLD AUTO: 41.4 % (ref 34–46.6)
HGB BLD-MCNC: 12.6 G/DL (ref 12–15.9)
HGB UR QL STRIP.AUTO: NEGATIVE
INR PPP: 1.21 (ref 0.91–1.09)
KETONES UR QL STRIP: ABNORMAL
LEUKOCYTE ESTERASE UR QL STRIP.AUTO: ABNORMAL
MCH RBC QN AUTO: 25.1 PG (ref 26.6–33)
MCHC RBC AUTO-ENTMCNC: 30.4 G/DL (ref 31.5–35.7)
MCV RBC AUTO: 82.6 FL (ref 79–97)
NITRITE UR QL STRIP: NEGATIVE
PH UR STRIP.AUTO: 6 [PH] (ref 5–8)
PLATELET # BLD AUTO: 274 10*3/MM3 (ref 140–450)
PMV BLD AUTO: 11.7 FL (ref 6–12)
POTASSIUM SERPL-SCNC: 4.2 MMOL/L (ref 3.5–5.2)
PROT SERPL-MCNC: 7.3 G/DL (ref 6–8.5)
PROT UR QL STRIP: NEGATIVE
PROTHROMBIN TIME: 15.4 SECONDS (ref 11.8–14.8)
RBC # BLD AUTO: 5.01 10*6/MM3 (ref 3.77–5.28)
SODIUM SERPL-SCNC: 135 MMOL/L (ref 136–145)
SP GR UR STRIP: 1.02 (ref 1–1.03)
TSH SERPL DL<=0.05 MIU/L-ACNC: 3.5 UIU/ML (ref 0.27–4.2)
UROBILINOGEN UR QL STRIP: ABNORMAL
WBC NRBC COR # BLD: 8.58 10*3/MM3 (ref 3.4–10.8)

## 2023-01-06 PROCEDURE — 80050 GENERAL HEALTH PANEL: CPT

## 2023-01-06 PROCEDURE — 81003 URINALYSIS AUTO W/O SCOPE: CPT

## 2023-01-06 PROCEDURE — 85730 THROMBOPLASTIN TIME PARTIAL: CPT

## 2023-01-06 PROCEDURE — 85610 PROTHROMBIN TIME: CPT

## 2023-01-06 PROCEDURE — 36415 COLL VENOUS BLD VENIPUNCTURE: CPT

## 2023-01-06 NOTE — PROGRESS NOTES
Pre Sx Education Class   Information and Education Class for Pre and Post     Surgery Care, Diets and Daily Living.       Surgery Type: Sleeve Gastrectomy Consent on File    Height: 5'0  Weight: 245.4lb   BMI:       Diet Stages Form given including diet stages and surgery date/time     Weight Loss Surgery Patient Contract on File      Patient has signed/agreed with the Diet Stage & Medication discontinue sheet:       1) Medications have been review by Dr Dominique.      2) Patient informed to stop NSAID'S one week prior to surgery.                         If the patient is taking Metformin he/she will need to discontinue                   two weeks prior to surgery.                      Birth control Medications are to be discontinued two weeks prior                  and restart four weeks post surgery.                     They have also been instructed not to take the following medications the morning    of their procedure on the Bariatric Surgery Instructions Sheet: multivitamins, carafate, tums     3) Dr Dominique/Surgeon recommends that this patient take the following two        hours prior to their arrival time:         A.     Medications as directed by surgery staff at Pre Sx Class    B.     2 extra strength Tylenol (1000mg)                           C.     8 ounces (Only) of Sugar Free Gatorade, G2 or Powerade Zero                                   (no red or pink in color)                             4) During Bootcamp our patient also met with the Outpatient Surgery Staff  for pre-surgery instructions.          Patient also received [facility] Surgery Post Follow up Appointments.

## 2023-01-10 ENCOUNTER — ANESTHESIA EVENT (OUTPATIENT)
Dept: PERIOP | Facility: HOSPITAL | Age: 28
DRG: 621 | End: 2023-01-10
Payer: MEDICAID

## 2023-01-12 ENCOUNTER — HOSPITAL ENCOUNTER (INPATIENT)
Facility: HOSPITAL | Age: 28
LOS: 1 days | Discharge: HOME | DRG: 621 | End: 2023-01-13
Attending: SURGERY | Admitting: SURGERY
Payer: MEDICAID

## 2023-01-12 ENCOUNTER — ANESTHESIA (OUTPATIENT)
Dept: PERIOP | Facility: HOSPITAL | Age: 28
DRG: 621 | End: 2023-01-12
Payer: MEDICAID

## 2023-01-12 DIAGNOSIS — E66.01 CLASS 3 SEVERE OBESITY DUE TO EXCESS CALORIES WITH SERIOUS COMORBIDITY AND BODY MASS INDEX (BMI) OF 50.0 TO 59.9 IN ADULT: ICD-10-CM

## 2023-01-12 DIAGNOSIS — Z98.84 STATUS POST LAPAROSCOPIC SLEEVE GASTRECTOMY: Primary | ICD-10-CM

## 2023-01-12 DIAGNOSIS — R12 HEARTBURN: ICD-10-CM

## 2023-01-12 LAB
ABO GROUP BLD: NORMAL
BLD GP AB SCN SERPL QL: NEGATIVE
HCG SERPL QL: NEGATIVE
RH BLD: POSITIVE
T&S EXPIRATION DATE: NORMAL

## 2023-01-12 PROCEDURE — 0 LIDOCAINE 1 % SOLUTION 20 ML VIAL: Performed by: SURGERY

## 2023-01-12 PROCEDURE — 84703 CHORIONIC GONADOTROPIN ASSAY: CPT | Performed by: SURGERY

## 2023-01-12 PROCEDURE — 25010000002 ENOXAPARIN PER 10 MG: Performed by: SURGERY

## 2023-01-12 PROCEDURE — 43775 LAP SLEEVE GASTRECTOMY: CPT | Performed by: SURGERY

## 2023-01-12 PROCEDURE — 25010000002 DROPERIDOL PER 5 MG: Performed by: NURSE ANESTHETIST, CERTIFIED REGISTERED

## 2023-01-12 PROCEDURE — 25010000002 FENTANYL CITRATE (PF) 100 MCG/2ML SOLUTION: Performed by: NURSE ANESTHETIST, CERTIFIED REGISTERED

## 2023-01-12 PROCEDURE — 25010000002 DEXAMETHASONE PER 1 MG: Performed by: ANESTHESIOLOGY

## 2023-01-12 PROCEDURE — 25010000002 ONDANSETRON PER 1 MG: Performed by: NURSE ANESTHETIST, CERTIFIED REGISTERED

## 2023-01-12 PROCEDURE — 25010000002 MIDAZOLAM PER 1 MG: Performed by: ANESTHESIOLOGY

## 2023-01-12 PROCEDURE — 25010000002 HYDROMORPHONE 1 MG/ML SOLUTION: Performed by: NURSE ANESTHETIST, CERTIFIED REGISTERED

## 2023-01-12 PROCEDURE — 25010000002 KETOROLAC TROMETHAMINE PER 15 MG: Performed by: SURGERY

## 2023-01-12 PROCEDURE — 25010000002 PROPOFOL 10 MG/ML EMULSION: Performed by: NURSE ANESTHETIST, CERTIFIED REGISTERED

## 2023-01-12 PROCEDURE — 25010000002 DEXAMETHASONE PER 1 MG: Performed by: NURSE ANESTHETIST, CERTIFIED REGISTERED

## 2023-01-12 PROCEDURE — 88307 TISSUE EXAM BY PATHOLOGIST: CPT | Performed by: SURGERY

## 2023-01-12 PROCEDURE — 94799 UNLISTED PULMONARY SVC/PX: CPT

## 2023-01-12 PROCEDURE — 86900 BLOOD TYPING SEROLOGIC ABO: CPT | Performed by: SURGERY

## 2023-01-12 PROCEDURE — 0DB64Z3 EXCISION OF STOMACH, PERCUTANEOUS ENDOSCOPIC APPROACH, VERTICAL: ICD-10-PCS | Performed by: SURGERY

## 2023-01-12 PROCEDURE — 25010000002 ONDANSETRON PER 1 MG: Performed by: SURGERY

## 2023-01-12 PROCEDURE — 25010000002 CEFAZOLIN PER 500 MG: Performed by: SURGERY

## 2023-01-12 PROCEDURE — 86901 BLOOD TYPING SEROLOGIC RH(D): CPT | Performed by: SURGERY

## 2023-01-12 PROCEDURE — 86850 RBC ANTIBODY SCREEN: CPT | Performed by: SURGERY

## 2023-01-12 PROCEDURE — 25010000002 KETOROLAC TROMETHAMINE PER 15 MG: Performed by: NURSE ANESTHETIST, CERTIFIED REGISTERED

## 2023-01-12 DEVICE — STAPLER 60 RELOAD WHITE
Type: IMPLANTABLE DEVICE | Site: ABDOMEN | Status: FUNCTIONAL
Brand: SUREFORM

## 2023-01-12 DEVICE — STAPLER 60 RELOAD BLUE
Type: IMPLANTABLE DEVICE | Site: ABDOMEN | Status: FUNCTIONAL
Brand: SUREFORM

## 2023-01-12 DEVICE — HEMOST ABS SURGICEL SNOW 4X4IN: Type: IMPLANTABLE DEVICE | Site: ABDOMEN | Status: FUNCTIONAL

## 2023-01-12 RX ORDER — LABETALOL HYDROCHLORIDE 5 MG/ML
5 INJECTION, SOLUTION INTRAVENOUS
Status: DISCONTINUED | OUTPATIENT
Start: 2023-01-12 | End: 2023-01-12 | Stop reason: HOSPADM

## 2023-01-12 RX ORDER — SODIUM CHLORIDE 0.9 % (FLUSH) 0.9 %
1-10 SYRINGE (ML) INJECTION AS NEEDED
Status: DISCONTINUED | OUTPATIENT
Start: 2023-01-12 | End: 2023-01-13 | Stop reason: HOSPADM

## 2023-01-12 RX ORDER — NALOXONE HCL 0.4 MG/ML
0.04 VIAL (ML) INJECTION AS NEEDED
Status: DISCONTINUED | OUTPATIENT
Start: 2023-01-12 | End: 2023-01-12 | Stop reason: HOSPADM

## 2023-01-12 RX ORDER — DEXAMETHASONE SODIUM PHOSPHATE 4 MG/ML
4 INJECTION, SOLUTION INTRA-ARTICULAR; INTRALESIONAL; INTRAMUSCULAR; INTRAVENOUS; SOFT TISSUE EVERY 8 HOURS PRN
Status: DISCONTINUED | OUTPATIENT
Start: 2023-01-12 | End: 2023-01-13 | Stop reason: HOSPADM

## 2023-01-12 RX ORDER — ENOXAPARIN SODIUM 100 MG/ML
40 INJECTION SUBCUTANEOUS ONCE
Status: COMPLETED | OUTPATIENT
Start: 2023-01-12 | End: 2023-01-12

## 2023-01-12 RX ORDER — ROCURONIUM BROMIDE 10 MG/ML
INJECTION, SOLUTION INTRAVENOUS AS NEEDED
Status: DISCONTINUED | OUTPATIENT
Start: 2023-01-12 | End: 2023-01-12 | Stop reason: SURG

## 2023-01-12 RX ORDER — NALOXONE HCL 0.4 MG/ML
0.1 VIAL (ML) INJECTION
Status: DISCONTINUED | OUTPATIENT
Start: 2023-01-12 | End: 2023-01-13 | Stop reason: HOSPADM

## 2023-01-12 RX ORDER — SODIUM CHLORIDE, SODIUM LACTATE, POTASSIUM CHLORIDE, CALCIUM CHLORIDE 600; 310; 30; 20 MG/100ML; MG/100ML; MG/100ML; MG/100ML
9 INJECTION, SOLUTION INTRAVENOUS CONTINUOUS
Status: DISCONTINUED | OUTPATIENT
Start: 2023-01-12 | End: 2023-01-12 | Stop reason: HOSPADM

## 2023-01-12 RX ORDER — ONDANSETRON 2 MG/ML
4 INJECTION INTRAMUSCULAR; INTRAVENOUS EVERY 6 HOURS
Status: DISCONTINUED | OUTPATIENT
Start: 2023-01-12 | End: 2023-01-13 | Stop reason: HOSPADM

## 2023-01-12 RX ORDER — SODIUM CHLORIDE, SODIUM LACTATE, POTASSIUM CHLORIDE, CALCIUM CHLORIDE 600; 310; 30; 20 MG/100ML; MG/100ML; MG/100ML; MG/100ML
75 INJECTION, SOLUTION INTRAVENOUS CONTINUOUS
Status: DISCONTINUED | OUTPATIENT
Start: 2023-01-12 | End: 2023-01-13 | Stop reason: HOSPADM

## 2023-01-12 RX ORDER — LIDOCAINE HYDROCHLORIDE 20 MG/ML
INJECTION, SOLUTION EPIDURAL; INFILTRATION; INTRACAUDAL; PERINEURAL AS NEEDED
Status: DISCONTINUED | OUTPATIENT
Start: 2023-01-12 | End: 2023-01-12 | Stop reason: SURG

## 2023-01-12 RX ORDER — GABAPENTIN 250 MG/5ML
250 SOLUTION ORAL ONCE
Status: COMPLETED | OUTPATIENT
Start: 2023-01-12 | End: 2023-01-12

## 2023-01-12 RX ORDER — KETOROLAC TROMETHAMINE 30 MG/ML
INJECTION, SOLUTION INTRAMUSCULAR; INTRAVENOUS AS NEEDED
Status: DISCONTINUED | OUTPATIENT
Start: 2023-01-12 | End: 2023-01-12 | Stop reason: SURG

## 2023-01-12 RX ORDER — DEXAMETHASONE SODIUM PHOSPHATE 4 MG/ML
INJECTION, SOLUTION INTRA-ARTICULAR; INTRALESIONAL; INTRAMUSCULAR; INTRAVENOUS; SOFT TISSUE AS NEEDED
Status: DISCONTINUED | OUTPATIENT
Start: 2023-01-12 | End: 2023-01-12 | Stop reason: SURG

## 2023-01-12 RX ORDER — MAGNESIUM HYDROXIDE 1200 MG/15ML
LIQUID ORAL AS NEEDED
Status: DISCONTINUED | OUTPATIENT
Start: 2023-01-12 | End: 2023-01-12 | Stop reason: HOSPADM

## 2023-01-12 RX ORDER — FAMOTIDINE 10 MG/ML
20 INJECTION, SOLUTION INTRAVENOUS
Status: DISCONTINUED | OUTPATIENT
Start: 2023-01-12 | End: 2023-01-12 | Stop reason: HOSPADM

## 2023-01-12 RX ORDER — LIDOCAINE HYDROCHLORIDE 10 MG/ML
0.5 INJECTION, SOLUTION EPIDURAL; INFILTRATION; INTRACAUDAL; PERINEURAL ONCE AS NEEDED
Status: DISCONTINUED | OUTPATIENT
Start: 2023-01-12 | End: 2023-01-12 | Stop reason: HOSPADM

## 2023-01-12 RX ORDER — DEXAMETHASONE SODIUM PHOSPHATE 4 MG/ML
4 INJECTION, SOLUTION INTRA-ARTICULAR; INTRALESIONAL; INTRAMUSCULAR; INTRAVENOUS; SOFT TISSUE ONCE AS NEEDED
Status: COMPLETED | OUTPATIENT
Start: 2023-01-12 | End: 2023-01-12

## 2023-01-12 RX ORDER — FENTANYL CITRATE 50 UG/ML
25 INJECTION, SOLUTION INTRAMUSCULAR; INTRAVENOUS
Status: DISCONTINUED | OUTPATIENT
Start: 2023-01-12 | End: 2023-01-12 | Stop reason: HOSPADM

## 2023-01-12 RX ORDER — SODIUM CHLORIDE 0.9 % (FLUSH) 0.9 %
10 SYRINGE (ML) INJECTION AS NEEDED
Status: DISCONTINUED | OUTPATIENT
Start: 2023-01-12 | End: 2023-01-12 | Stop reason: HOSPADM

## 2023-01-12 RX ORDER — DIPHENHYDRAMINE HYDROCHLORIDE 50 MG/ML
25 INJECTION INTRAMUSCULAR; INTRAVENOUS EVERY 6 HOURS PRN
Status: DISCONTINUED | OUTPATIENT
Start: 2023-01-12 | End: 2023-01-13 | Stop reason: HOSPADM

## 2023-01-12 RX ORDER — DEXTROSE MONOHYDRATE 25 G/50ML
12.5 INJECTION, SOLUTION INTRAVENOUS AS NEEDED
Status: DISCONTINUED | OUTPATIENT
Start: 2023-01-12 | End: 2023-01-12 | Stop reason: HOSPADM

## 2023-01-12 RX ORDER — BUPIVACAINE HCL/0.9 % NACL/PF 0.1 %
2 PLASTIC BAG, INJECTION (ML) EPIDURAL ONCE
Status: COMPLETED | OUTPATIENT
Start: 2023-01-12 | End: 2023-01-12

## 2023-01-12 RX ORDER — SODIUM CHLORIDE 0.9 % (FLUSH) 0.9 %
3 SYRINGE (ML) INJECTION AS NEEDED
Status: DISCONTINUED | OUTPATIENT
Start: 2023-01-12 | End: 2023-01-12 | Stop reason: HOSPADM

## 2023-01-12 RX ORDER — SODIUM CHLORIDE, SODIUM LACTATE, POTASSIUM CHLORIDE, CALCIUM CHLORIDE 600; 310; 30; 20 MG/100ML; MG/100ML; MG/100ML; MG/100ML
1000 INJECTION, SOLUTION INTRAVENOUS CONTINUOUS
Status: DISCONTINUED | OUTPATIENT
Start: 2023-01-12 | End: 2023-01-12 | Stop reason: HOSPADM

## 2023-01-12 RX ORDER — SODIUM CHLORIDE 9 MG/ML
40 INJECTION, SOLUTION INTRAVENOUS AS NEEDED
Status: DISCONTINUED | OUTPATIENT
Start: 2023-01-12 | End: 2023-01-13 | Stop reason: HOSPADM

## 2023-01-12 RX ORDER — KETAMINE HCL IN NACL, ISO-OSM 100MG/10ML
SYRINGE (ML) INJECTION AS NEEDED
Status: DISCONTINUED | OUTPATIENT
Start: 2023-01-12 | End: 2023-01-12 | Stop reason: SURG

## 2023-01-12 RX ORDER — HYDROMORPHONE HYDROCHLORIDE 1 MG/ML
0.5 INJECTION, SOLUTION INTRAMUSCULAR; INTRAVENOUS; SUBCUTANEOUS
Status: DISCONTINUED | OUTPATIENT
Start: 2023-01-12 | End: 2023-01-13 | Stop reason: HOSPADM

## 2023-01-12 RX ORDER — SCOLOPAMINE TRANSDERMAL SYSTEM 1 MG/1
1 PATCH, EXTENDED RELEASE TRANSDERMAL ONCE
Status: DISCONTINUED | OUTPATIENT
Start: 2023-01-12 | End: 2023-01-12

## 2023-01-12 RX ORDER — FLUMAZENIL 0.1 MG/ML
0.2 INJECTION INTRAVENOUS AS NEEDED
Status: DISCONTINUED | OUTPATIENT
Start: 2023-01-12 | End: 2023-01-12 | Stop reason: HOSPADM

## 2023-01-12 RX ORDER — ACETAMINOPHEN 160 MG/5ML
325 SOLUTION ORAL EVERY 6 HOURS
Status: DISCONTINUED | OUTPATIENT
Start: 2023-01-12 | End: 2023-01-13 | Stop reason: HOSPADM

## 2023-01-12 RX ORDER — ONDANSETRON 2 MG/ML
4 INJECTION INTRAMUSCULAR; INTRAVENOUS EVERY 6 HOURS PRN
Status: DISCONTINUED | OUTPATIENT
Start: 2023-01-12 | End: 2023-01-12 | Stop reason: SDUPTHER

## 2023-01-12 RX ORDER — KETOROLAC TROMETHAMINE 30 MG/ML
30 INJECTION, SOLUTION INTRAMUSCULAR; INTRAVENOUS EVERY 6 HOURS PRN
Status: DISCONTINUED | OUTPATIENT
Start: 2023-01-12 | End: 2023-01-13 | Stop reason: HOSPADM

## 2023-01-12 RX ORDER — LABETALOL HYDROCHLORIDE 5 MG/ML
10 INJECTION, SOLUTION INTRAVENOUS
Status: DISCONTINUED | OUTPATIENT
Start: 2023-01-12 | End: 2023-01-13 | Stop reason: HOSPADM

## 2023-01-12 RX ORDER — ENOXAPARIN SODIUM 100 MG/ML
40 INJECTION SUBCUTANEOUS DAILY
Status: DISCONTINUED | OUTPATIENT
Start: 2023-01-13 | End: 2023-01-13 | Stop reason: HOSPADM

## 2023-01-12 RX ORDER — FAMOTIDINE 10 MG/ML
20 INJECTION, SOLUTION INTRAVENOUS EVERY 12 HOURS SCHEDULED
Status: DISCONTINUED | OUTPATIENT
Start: 2023-01-12 | End: 2023-01-13 | Stop reason: HOSPADM

## 2023-01-12 RX ORDER — MIDAZOLAM HYDROCHLORIDE 1 MG/ML
1 INJECTION INTRAMUSCULAR; INTRAVENOUS
Status: DISCONTINUED | OUTPATIENT
Start: 2023-01-12 | End: 2023-01-12 | Stop reason: HOSPADM

## 2023-01-12 RX ORDER — DROPERIDOL 2.5 MG/ML
INJECTION, SOLUTION INTRAMUSCULAR; INTRAVENOUS AS NEEDED
Status: DISCONTINUED | OUTPATIENT
Start: 2023-01-12 | End: 2023-01-12 | Stop reason: SURG

## 2023-01-12 RX ORDER — HYDROMORPHONE HYDROCHLORIDE 1 MG/ML
0.5 INJECTION, SOLUTION INTRAMUSCULAR; INTRAVENOUS; SUBCUTANEOUS
Status: DISCONTINUED | OUTPATIENT
Start: 2023-01-12 | End: 2023-01-12 | Stop reason: HOSPADM

## 2023-01-12 RX ORDER — BUPIVACAINE HCL/0.9 % NACL/PF 0.1 %
2 PLASTIC BAG, INJECTION (ML) EPIDURAL EVERY 8 HOURS
Status: COMPLETED | OUTPATIENT
Start: 2023-01-12 | End: 2023-01-13

## 2023-01-12 RX ORDER — ONDANSETRON 2 MG/ML
INJECTION INTRAMUSCULAR; INTRAVENOUS AS NEEDED
Status: DISCONTINUED | OUTPATIENT
Start: 2023-01-12 | End: 2023-01-12 | Stop reason: SURG

## 2023-01-12 RX ORDER — FENTANYL CITRATE 50 UG/ML
INJECTION, SOLUTION INTRAMUSCULAR; INTRAVENOUS AS NEEDED
Status: DISCONTINUED | OUTPATIENT
Start: 2023-01-12 | End: 2023-01-12 | Stop reason: SURG

## 2023-01-12 RX ORDER — TRAMADOL HYDROCHLORIDE 50 MG/1
50 TABLET ORAL EVERY 6 HOURS PRN
Status: DISCONTINUED | OUTPATIENT
Start: 2023-01-12 | End: 2023-01-13 | Stop reason: HOSPADM

## 2023-01-12 RX ORDER — SODIUM CHLORIDE 0.9 % (FLUSH) 0.9 %
10 SYRINGE (ML) INJECTION EVERY 12 HOURS SCHEDULED
Status: DISCONTINUED | OUTPATIENT
Start: 2023-01-12 | End: 2023-01-13 | Stop reason: HOSPADM

## 2023-01-12 RX ORDER — PROPOFOL 10 MG/ML
VIAL (ML) INTRAVENOUS AS NEEDED
Status: DISCONTINUED | OUTPATIENT
Start: 2023-01-12 | End: 2023-01-12 | Stop reason: SURG

## 2023-01-12 RX ORDER — SIMETHICONE 80 MG
40 TABLET,CHEWABLE ORAL 4 TIMES DAILY PRN
Status: DISCONTINUED | OUTPATIENT
Start: 2023-01-12 | End: 2023-01-13 | Stop reason: HOSPADM

## 2023-01-12 RX ORDER — SODIUM CHLORIDE 0.9 % (FLUSH) 0.9 %
10 SYRINGE (ML) INJECTION EVERY 12 HOURS SCHEDULED
Status: DISCONTINUED | OUTPATIENT
Start: 2023-01-12 | End: 2023-01-12 | Stop reason: HOSPADM

## 2023-01-12 RX ORDER — SCOLOPAMINE TRANSDERMAL SYSTEM 1 MG/1
1 PATCH, EXTENDED RELEASE TRANSDERMAL CONTINUOUS
Status: DISCONTINUED | OUTPATIENT
Start: 2023-01-12 | End: 2023-01-12 | Stop reason: HOSPADM

## 2023-01-12 RX ADMIN — SUGAMMADEX 200 MG: 100 INJECTION, SOLUTION INTRAVENOUS at 10:41

## 2023-01-12 RX ADMIN — FENTANYL CITRATE 100 MCG: 50 INJECTION INTRAMUSCULAR; INTRAVENOUS at 09:09

## 2023-01-12 RX ADMIN — Medication 25 MG: at 09:36

## 2023-01-12 RX ADMIN — PROPOFOL INJECTABLE EMULSION 150 MCG/KG/MIN: 10 INJECTION, EMULSION INTRAVENOUS at 09:19

## 2023-01-12 RX ADMIN — PROPOFOL INJECTABLE EMULSION 200 MG: 10 INJECTION, EMULSION INTRAVENOUS at 09:13

## 2023-01-12 RX ADMIN — SODIUM CHLORIDE, POTASSIUM CHLORIDE, SODIUM LACTATE AND CALCIUM CHLORIDE 1000 ML: 600; 310; 30; 20 INJECTION, SOLUTION INTRAVENOUS at 07:47

## 2023-01-12 RX ADMIN — SODIUM CHLORIDE, POTASSIUM CHLORIDE, SODIUM LACTATE AND CALCIUM CHLORIDE 140 ML/HR: 600; 310; 30; 20 INJECTION, SOLUTION INTRAVENOUS at 21:32

## 2023-01-12 RX ADMIN — SODIUM CHLORIDE, POTASSIUM CHLORIDE, SODIUM LACTATE AND CALCIUM CHLORIDE 9 ML/HR: 600; 310; 30; 20 INJECTION, SOLUTION INTRAVENOUS at 08:34

## 2023-01-12 RX ADMIN — Medication 25 MG: at 09:16

## 2023-01-12 RX ADMIN — FAMOTIDINE 20 MG: 10 INJECTION INTRAVENOUS at 08:28

## 2023-01-12 RX ADMIN — ROCURONIUM BROMIDE 50 MG: 10 INJECTION, SOLUTION INTRAVENOUS at 09:13

## 2023-01-12 RX ADMIN — ENOXAPARIN SODIUM 40 MG: 100 INJECTION SUBCUTANEOUS at 08:29

## 2023-01-12 RX ADMIN — DEXAMETHASONE SODIUM PHOSPHATE 4 MG: 4 INJECTION INTRA-ARTICULAR; INTRALESIONAL; INTRAMUSCULAR; INTRAVENOUS; SOFT TISSUE at 08:28

## 2023-01-12 RX ADMIN — MIDAZOLAM HYDROCHLORIDE 1 MG: 2 INJECTION, SOLUTION INTRAMUSCULAR; INTRAVENOUS at 08:29

## 2023-01-12 RX ADMIN — ACETAMINOPHEN 325 MG: 325 SUSPENSION ORAL at 15:05

## 2023-01-12 RX ADMIN — ACETAMINOPHEN 325 MG: 325 SUSPENSION ORAL at 21:11

## 2023-01-12 RX ADMIN — ONDANSETRON 4 MG: 2 INJECTION INTRAMUSCULAR; INTRAVENOUS at 17:18

## 2023-01-12 RX ADMIN — KETOROLAC TROMETHAMINE 15 MG: 30 INJECTION, SOLUTION INTRAMUSCULAR; INTRAVENOUS at 10:24

## 2023-01-12 RX ADMIN — SCOPALAMINE 1 PATCH: 1 PATCH, EXTENDED RELEASE TRANSDERMAL at 08:29

## 2023-01-12 RX ADMIN — ONDANSETRON 4 MG: 2 INJECTION INTRAMUSCULAR; INTRAVENOUS at 21:30

## 2023-01-12 RX ADMIN — HYDROMORPHONE HYDROCHLORIDE 1 MG: 1 INJECTION, SOLUTION INTRAMUSCULAR; INTRAVENOUS; SUBCUTANEOUS at 10:35

## 2023-01-12 RX ADMIN — DROPERIDOL 0.62 MG: 2.5 INJECTION, SOLUTION INTRAMUSCULAR; INTRAVENOUS at 09:56

## 2023-01-12 RX ADMIN — Medication 10 ML: at 21:12

## 2023-01-12 RX ADMIN — KETOROLAC TROMETHAMINE 30 MG: 30 INJECTION, SOLUTION INTRAMUSCULAR; INTRAVENOUS at 21:12

## 2023-01-12 RX ADMIN — ONDANSETRON 4 MG: 2 INJECTION INTRAMUSCULAR; INTRAVENOUS at 10:24

## 2023-01-12 RX ADMIN — GABAPENTIN 250 MG: 250 SOLUTION ORAL at 07:47

## 2023-01-12 RX ADMIN — LIDOCAINE HYDROCHLORIDE 100 MG: 20 INJECTION, SOLUTION EPIDURAL; INFILTRATION; INTRACAUDAL; PERINEURAL at 09:13

## 2023-01-12 RX ADMIN — DEXAMETHASONE SODIUM PHOSPHATE 4 MG: 4 INJECTION, SOLUTION INTRA-ARTICULAR; INTRALESIONAL; INTRAMUSCULAR; INTRAVENOUS; SOFT TISSUE at 10:24

## 2023-01-12 RX ADMIN — Medication 2 G: at 09:16

## 2023-01-12 RX ADMIN — FAMOTIDINE 20 MG: 10 INJECTION INTRAVENOUS at 21:29

## 2023-01-12 RX ADMIN — CEFAZOLIN 2 G: 2 INJECTION, POWDER, FOR SOLUTION INTRAMUSCULAR; INTRAVENOUS at 18:06

## 2023-01-12 NOTE — ANESTHESIA POSTPROCEDURE EVALUATION
"Patient: Jennifer Antonio    Procedure Summary     Date: 01/12/23 Room / Location: [facility] OR  /  [facility] OR    Anesthesia Start: 0909 Anesthesia Stop: 1056    Procedure: GASTRIC SLEEVE LAPAROSCOPIC WITH DAVINCI ROBOT (Abdomen) Diagnosis:       Class 3 severe obesity due to excess calories with serious comorbidity and body mass index (BMI) of 50.0 to 59.9 in adult (HCC)      Heartburn      (Class 3 severe obesity due to excess calories with serious comorbidity and body mass index (BMI) of 50.0 to 59.9 in adult (HCC) [E66.01, Z68.43])      (Heartburn [R12])    Surgeons: Eloy Dominique MD Provider: Kuldeep Celaya CRNA    Anesthesia Type: general ASA Status: 3          Anesthesia Type: general    Vitals  Vitals Value Taken Time   /78 01/12/23 1300   Temp 97.8 °F (36.6 °C) 01/12/23 1300   Pulse 84 01/12/23 1300   Resp 14 01/12/23 1300   SpO2 98 % 01/12/23 1300           Post Anesthesia Care and Evaluation    Patient location during evaluation: PACU  Patient participation: complete - patient participated  Level of consciousness: awake and alert  Pain management: adequate    Airway patency: patent  Anesthetic complications: No anesthetic complications    Cardiovascular status: acceptable  Respiratory status: acceptable  Hydration status: acceptable    Comments: Blood pressure 127/76, pulse 83, temperature 98.7 °F (37.1 °C), temperature source Oral, resp. rate 16, height 152 cm (59.84\"), weight 111 kg (244 lb 7.8 oz), last menstrual period 12/26/2022, SpO2 98 %.    Pt discharged from PACU based on nicole score >8      "

## 2023-01-12 NOTE — ANESTHESIA PREPROCEDURE EVALUATION
Anesthesia Evaluation     Patient summary reviewed   history of anesthetic complications: PONV  NPO Solid Status: > 8 hours  NPO Liquid Status: > 4 hours           Airway   Mallampati: II  TM distance: >3 FB  Neck ROM: full  Dental      Pulmonary    (-) COPD, asthma, sleep apnea, not a smoker  Cardiovascular   Exercise tolerance: excellent (>7 METS)    (-) pacemaker, past MI, angina, cardiac stents      Neuro/Psych  (-) seizures, TIA, CVA  GI/Hepatic/Renal/Endo    (+) morbid obesity, GERD,    (-) liver disease, no renal disease, diabetes    Musculoskeletal     Abdominal    Substance History      OB/GYN          Other                        Anesthesia Plan    ASA 3     general     intravenous induction     Anesthetic plan, risks, benefits, and alternatives have been provided, discussed and informed consent has been obtained with: patient.        CODE STATUS:

## 2023-01-12 NOTE — BRIEF OP NOTE
GASTRIC SLEEVE LAPAROSCOPIC WITH DAVINCI ROBOT  Progress Note    Jennifer Antonio  1/12/2023    Pre-op Diagnosis:   Class 3 severe obesity due to excess calories with serious comorbidity and body mass index (BMI) of 50.0 to 59.9 in adult ([facility]) [E66.01, Z68.43]  Heartburn [R12]       Post-Op Diagnosis Codes:     * Class 3 severe obesity due to excess calories with serious comorbidity and body mass index (BMI) of 50.0 to 59.9 in adult ([facility]) [E66.01, Z68.43]     * Heartburn [R12]    Procedure/CPT® Codes:        Procedure(s):  GASTRIC SLEEVE LAPAROSCOPIC WITH DAVINCI ROBOT        Surgeon(s):  Eloy Dominique MD    Anesthesia: General    Staff:   Circulator: Rox Pandya RN; Gaston Arriola RN  Scrub Person: Rocio Baker; Berkley Marsh; Ruslan Rebolledo         Estimated Blood Loss: minimal    Urine Voided: * No values recorded between 1/12/2023  9:10 AM and 1/12/2023 10:32 AM *    Specimens:                Specimens     ID Source Type Tests Collected By Collected At Frozen?    A Gastric, Fundus Tissue · TISSUE PATHOLOGY EXAM   Eloy Dominique MD 1/12/23 0931 No            Findings: Within normal limits        Complications: None          Eloy Dominique MD     Date: 1/12/2023  Time: 10:42 CST

## 2023-01-12 NOTE — PROGRESS NOTES
Patient Care Team:  Lauren Riddle APRN as PCP - General (Family Medicine)    Reason for Visit:  Surgical Weight loss    Subjective      Jennifer Antonio is a pleasant 27 y.o. female and presents with morbid obesity with her Body mass index is 48 kg/m².    She is here for discussion of weight loss options.  She stated she has been with the disease of obesity for year(s).  She stated she suffers from heartburn and morbid obesity due to her weight gain.  She stated that weight loss helps alleviate these symptoms.   She stated that she has tried multiple diet regimens to help with weight loss.  She stated that she has attempted these conservative methods for weight loss without maintaining long term success.  Today she would like to discuss surgical weight loss options such as the Laparoscopic Sleeve Gastrectomy or the Laparoscopic R - Y Gastric Bypass.      Review of Systems  General ROS: negative  Respiratory ROS: no cough, shortness of breath, or wheezing  Cardiovascular ROS: no chest pain or dyspnea on exertion  Gastrointestinal ROS: no abdominal pain, change in bowel habits, or black or bloody stools  positive for - heartburn  Musculoskeletal ROS: positive for - joint pain    History  Past Medical History:   Diagnosis Date   • GERD (gastroesophageal reflux disease)    • Heartburn    • History of transfusion 1997    @ approx. 2 year old; no adverse reaction.   • Obesity    • PONV (postoperative nausea and vomiting)      Past Surgical History:   Procedure Laterality Date   • BARTHOLIN GLAND CYST EXCISION  2018   • CHOLECYSTECTOMY     • ENDOSCOPY N/A 11/04/2022    Procedure: ESOPHAGOGASTRODUODENOSCOPY WITH ANESTHESIA;  Surgeon: Eloy Dominique MD;  Location: [facility] ENDOSCOPY;  Service: General;  Laterality: N/A;  pre heartburn  post normal  Lauren Aggarwal   • SALPINGECTOMY Bilateral      Family History   Problem Relation Age of Onset   • Hypertension Mother    • Diabetes Mother    • Sleep apnea Father     • No Known Problems Sister    • SIDS Sister    • SIDS Sister    • No Known Problems Brother    • Obesity Maternal Grandmother    • Obesity Paternal Grandmother    • Obesity Paternal Grandfather    • Diabetes Brother      Social History     Tobacco Use   • Smoking status: Never   • Smokeless tobacco: Never   Vaping Use   • Vaping Use: Never used   Substance Use Topics   • Alcohol use: Never   • Drug use: Not Currently     E-cigarette/Vaping   • E-cigarette/Vaping Use Never User      E-cigarette/Vaping Substances     Medications Prior to Admission   Medication Sig Dispense Refill Last Dose   • multivitamin with minerals tablet tablet Take 1 tablet by mouth Daily.   Past Week   • pantoprazole (PROTONIX) 40 MG EC tablet Take 40 mg by mouth Daily.   1/12/2023 at 0500   • sucralfate (CARAFATE) 1 g tablet PRN   Past Month   • calcium carbonate (TUMS) 500 MG chewable tablet Chew 1 tablet Daily.   More than a month     Allergies:  Patient has no known allergies.      Current Facility-Administered Medications:   •  ceFAZolin in 0.9% normal saline (ANCEF) IVPB solution 2 g, 2 g, Intravenous, Once, Eloy Dominique MD  •  dextrose (D50W) (25 g/50 mL) IV injection 12.5 g, 12.5 g, Intravenous, PRN, Porfirio Montelongo MD  •  famotidine (PEPCID) injection 20 mg, 20 mg, Intravenous, 60 Min Pre-Op, Porfirio Montelongo MD, 20 mg at 01/12/23 0828  •  fentaNYL citrate (PF) (SUBLIMAZE) injection 25 mcg, 25 mcg, Intravenous, Q5 Min PRN, Porfirio Montelongo MD  •  lactated ringers bolus 500 mL, 500 mL, Intravenous, Once, Eloy Dominique MD, Stopped at 01/12/23 0829  •  lactated ringers infusion 1,000 mL, 1,000 mL, Intravenous, Continuous, Eloy Dominique MD  •  lactated ringers infusion 1,000 mL, 1,000 mL, Intravenous, Continuous, Eloy Dominique MD, Last Rate: 25 mL/hr at 01/12/23 0747, 1,000 mL at 01/12/23 0747  •  lactated ringers infusion, 9 mL/hr, Intravenous, Continuous, Porfirio Montelongo MD,  Last Rate: 9 mL/hr at 01/12/23 0834, 9 mL/hr at 01/12/23 0834  •  lidocaine PF 1% (XYLOCAINE) injection 0.5 mL, 0.5 mL, Intradermal, Once PRN, Eloy Dominique MD  •  lidocaine PF 1% (XYLOCAINE) injection 0.5 mL, 0.5 mL, Injection, Once PRN, Porfirio Montelongo MD  •  midazolam (VERSED) injection 1 mg, 1 mg, Intravenous, Q10 Min PRN, Porfirio Montelongo MD, 1 mg at 01/12/23 0829  •  scopolamine patch 1 mg/72 hr, 1 patch, Transdermal, Continuous, Eloy Dominique MD  •  scopolamine patch 1 mg/72 hr, 1 patch, Transdermal, Once, Porfirio Montelongo MD, 1 patch at 01/12/23 0829  •  sodium chloride 0.9 % flush 10 mL, 10 mL, Intravenous, PRN, Eloy Dominique MD  •  sodium chloride 0.9 % flush 10 mL, 10 mL, Intravenous, Q12H, Porfirio Montelongo MD  •  sodium chloride 0.9 % flush 10 mL, 10 mL, Intravenous, PRN, Porfirio Montelongo MD  •  sodium chloride 0.9 % flush 3 mL, 3 mL, Intravenous, PRN, Eloy Dominique MD    Objective     Vital Signs  Temp:  [97.6 °F (36.4 °C)] 97.6 °F (36.4 °C)  Heart Rate:  [81] 81  Resp:  [18] 18  BP: (133)/(86) 133/86  Body mass index is 48 kg/m².      01/12/23  0704   Weight: 111 kg (244 lb 7.8 oz)       General Appearance:  awake, alert, oriented, in no acute distress  Lungs:  Normal expansion.  Clear to auscultation.  No rales, rhonchi, or wheezing.  Heart:  Heart regular rate and rhythm  Abdomen:  Soft, non-tender, normal bowel sounds; no bruits, organomegaly or masses.  Abnormal shape: obese  Extremities: Extremities warm to touch, pink, with no edema.      Results Review:   I reviewed the patient's new clinical results.        Assessment & Plan   Encounter Diagnoses   Name Primary?   • Class 3 severe obesity due to excess calories with serious comorbidity and body mass index (BMI) of 50.0 to 59.9 in adult (HCC) Yes   • Heartburn        I believe this patient will be a good candidate for weight loss surgery.    She has chosen laparoscopic sleeve  gastrectomy. I agree with this decision.  I have discussed the Maya - Y Gastric Bypass, laparoscopic sleeve gastrectomy and the Laparoscopic Gastric Band procedures to provide the alternatives which includes non surgical weight loss options as well.  We discussed the benefits of the surgeries including the benefit of weight loss and the possible reversal of co-morbid conditions associated with morbid obesity.  She is aware that the Sleeve procedure is not reversible.  We discussed the complications and risks which include the risk of perforation, leakage,bleeding, intra-abdominal organ injury, specifically at high risks of the liver and spleen, stenosis or ulcerations, the risk of venous thrombosis formation in the lungs, mesentery veins or lower extremities  leading to possible organ injury and death.  I explained to the patient that there is a risk of infection.  I explained to her the possibility that this procedure may not be performed laparoscopically and may require being converted to an opened procedure or aborted due to abnormal anatomy.  Also postoperatively there is a risk of increased GERD symptoms after this procedure.  I have explained if she develops intestinal metaplasia of her esophagus consideration for conversion to another weight loss procedure may be necessary.    I have explained to the patient that depression, addictive behaviors and even an increase in suicidal emotions can occur after surgery and even though they have undergone a mental health evaluation through psychology/psychiatry or by a registered therapist she may require additional evaluation and treatment in the future.  Nicotine cessation needs to continue even after their surgical procedure and nicotine products should be avoided due to the side effects of these products.  I explained to Jennifer Antonio not to plan for pregnancy within 12 to 18 months of her procedure.  I explained to her postoperatively she should avoid having  unprotected sex that would increase her risk of pregnancy during the postoperative period of 1 to 1-1/2 years.      I have reviewed with the patient her 30-day mortality risk using the ACS Surgical Risk Calculator to be below average of 0.1%.    I explained to the patient that the success of the surgery is directly related to the motivation and dedication to behavioral changes that they have learned during their preoperative course.  There is data that shows approximately 10% of patients may have satisfactory weight loss or regain their weight they have lost after surgery.  It is more likely due to returning to the previous behaviors they incorporated during their disease of obesity.  I also encourage Jennifer Antonio to incorporate exercise again after surgery weight restrictions have been removed postoperatively.    Froylan Report   As part of this patient's treatment plan I am prescribing controlled substances.  We review Froylan in our educational course.  The patient has been made aware of appropriate use of such medications, including potential risk of somnolence, limited ability to drive and /or work safely, and potential for dependence or overdose. It has also been made clear that these medications are for use by this patient only, without concomitant use of alcohol or other substances unless prescribed.    Jennifer Antonio will be required to complete the educational preoperative class detailing terms of the preoperative and postoperative recommendations, risks of surgery, the monitoring of the patient's FROYLAN reports if necessary. Jennifer Antonio is aware that inappropriate use of prescribed medications will result in cessation of prescribing such medications.    FROYLAN report has been reviewed.      History and physical exam exhibit continued safe and appropriate use of controlled substances.       Jennifer WALLACE Paco understands the surgical procedures and the different surgical options that are available.    Jennifer Antonio understands the lifestyle changes that are required after surgery and has agreed to follow the guidelines outlined in the weight management program. Jennifer Antonio also expressed understanding of the risks involved and had all of her questions answered and desires to proceed.    Upon completion of our discussion and addressing and answering her questions to her satisfaction, informed consent was obtained.   She will be scheduled accordingly for a laparoscopic Sleeve gastrectomy procedure.      I discussed the patient's findings and my recommendations with patient.     I have also recommended that she obtain completion of her preoperative educational course which she has completed, diet regimen and laboratory work prior to surgery consideration.      Dr. Eloy Dominique MD FACS    01/12/23  09:01 CST  Patient Care Team:  Lauren Riddle APRN as PCP - General (Family Medicine)

## 2023-01-12 NOTE — ANESTHESIA PROCEDURE NOTES
Airway  Urgency: elective    Date/Time: 1/12/2023 9:15 AM  Airway not difficult    General Information and Staff    Patient location during procedure: OR  CRNA/CAA: Kuldeep Celaya CRNA    Indications and Patient Condition  Indications for airway management: airway protection    Preoxygenated: yes  MILS maintained throughout  Mask difficulty assessment: 1 - vent by mask    Final Airway Details  Final airway type: endotracheal airway      Successful airway: ETT  Cuffed: yes   Successful intubation technique: direct laryngoscopy  Endotracheal tube insertion site: oral  Blade: Cervantes  Blade size: 2  ETT size (mm): 7.5  Cormack-Lehane Classification: grade I - full view of glottis  Placement verified by: chest auscultation and capnometry   Inital cuff pressure (cm H2O): 21  Cuff volume (mL): 5  Measured from: gums  Number of attempts at approach: 1  Assessment: lips, teeth, and gum same as pre-op and atraumatic intubation    Additional Comments  Dl by celi srna

## 2023-01-12 NOTE — OP NOTE
GASTRIC SLEEVE LAPAROSCOPIC WITH DAVINCI ROBOT  Progress Note     Jennifer Antonio  1/12/2023     Pre-op Diagnosis:   Class 3 severe obesity due to excess calories with serious comorbidity and body mass index (BMI) of 50.0 to 59.9 in adult ([facility]) [E66.01, Z68.43]  Heartburn [R12]       Post-Op Diagnosis Codes:     * Class 3 severe obesity due to excess calories with serious comorbidity and body mass index (BMI) of 50.0 to 59.9 in adult ([facility]) [E66.01, Z68.43]     * Heartburn [R12]         Indications: The patient was admitted to the hospital with history of morbid obesity with a Body mass index is 48 kg/m²..   she is scheduled for a Laparoscopic Sleeve Gastrectomy with robotic assistance.       Surgeon: Eloy Dominique MD     Assistants:Circulator: Rox Pandya RN; Gaston Arriola RN  Scrub Person: Rocio Baker; Berkley Marsh; Ruslan Rebolledo    Anesthesia: General endotracheal anesthesia    ASA Class: 3          Procedure Details       After the patient was explained the alternatives, benefits, complications, and risks of the robotic-assisted laparoscopic sleeve gastrectomy an informed consent was provided.  The patient was brought to the OR suite after receiving preoperative antibiotics, medications and anticoagulation.  The patient was placed under general anesthesia.  The patient was then prepped and draped in standard fashion.  We performed a surgical timeout.  An 40 Spanish bougie was placed into the oropharynx by anesthesia followed by placement to suction.  I then proceeded to locally anesthetize the left upper quadrant site for placement of a Veress needle to insufflate the abdominal cavity to a pressure of 15 mmHg.  This was followed by placement of an 8 mm incision which was followed by placement of an 8 mm trocar into the abdominal cavity with camera assist location left upper quadrant.   This trocar was my AirSeal trocar and it was placed into the abdominal cavity under direct visualization.  The  Veress needle was identified and removed safely.  An 8 mm incision was made in the periumbilical midline location for placement of an 8 mm trocar under direct visualization, location approximately 28 cm from the sternal notch.  An 12 mm trocar was placed on the patient's right upper quadrant lateral to the periumbilical trocar under direct visualization as well as an 8 mm trocar placed in the left upper quadrant's lateral site along the same plane under direct visualization. This was then followed by placement of an 8 mm trocar in the left quadrant lateral to the site additionally.   A 5 mm incision was placed in the subxiphoid location for placement of a 5 mm trocar under direct visualization.  I removed this trocar and replaced it with a Demarcus liver retractor.  No significant hiatal hernia defect.  Once adequate exposure of the hiatus and stomach was obtained, I proceeded to dock the robot to the trocars.  This was assisted by targeting.  My robotic instruments were then placed under direct visualization into the surgical field.  After breaking scrub away from the surgical table I went to the robotic console and began the procedure.  First I proceeded with dissecting the fat pad near the angle of His away from the diaphragm.  I then proceeded with ligating the greater curvature vessels starting at the mid greater curvature working my way distally and proximally where my most distal ligation site was 6 cm from the pylorus.  I continued proximally along the greater curvature ligating the vessels as well as a short gastrics.  This was all accomplished using the vessel sealer.  Once completion of ligation of the vessels was obtained. I dissected away attachments found posteriorly to the stomach.  Care was made to assure no injury to the pancreas. Completion of my dissection was obtained once visualization of the posterior left crural muscle was seen.  I proceeded with marking the stomach in standard fashion.   Marking points visually which was approximately 1-1/2 cm from the GE junction, point B which is approximately 3 cm from the angularis incisura and finally point C which is approximately 6 cm proximal from the pylorus.  The sleeve gastrectomy was then created.  I proceeded with stapling the stomach in this fashion based off of these points.  I fired my most distal load first and completed the firing of the stapler device to create the sleeve gastrectomy hugging the bougie.  Again my most distal staple line was approximately 6 cm from the pylorus and I continued proximally along the greater curvature and assuring that the width from the angularis incisura was at least 3 cm.  My final proximal stapler was at least 1-2 cm from the GE junction.    I then assessed the staple line to observe for hemostasis.  Once hemostasis was confirmed I then proceeded with my leak test.  I requested insufflation of the bougie into the sleeve utilizing air and submerging the staple line under saline.  I observed for any evidence of bubbles or leakage.  There was no evidence of leakage or bubbles noted and I then proceeded with suctioning out the air from the sleeve.  Once there was no evidence of leakage I then requested that the bougie be placed off suction, given a small puff and removed under direct visualization.    I placed Surgicel SNoW on the staple line under direct visualization.    I then pexied the omentum to the proximal portion of the sleeve with 2-0 absorbable suture.  I reassessed for hemostasis and then proceeded to scrub back into the case for removal of the resected portion of the stomach after the robotic instruments were removed under direct visualization.  The resected portion of the stomach was removed from the 12 mm trocar site under direct visualization.    The 12 mm trocar site's fascia was reapproximated using a Jair-Swati device and an 0 Vicryl suture.   The liver retractor was removed under direct  visualization as well as the pneumoperitoneum and remaining trocars.  Then re-locally anesthetized skin wounds for closure.  Skin wounds were closed with 4-0 Monocryl.  Prior to closing skin wounds all lap pads and attachments were accounted for at the end of the procedure.      White staples: 5  Blue staples: 1      Findings: Within normal limits    Estimated Blood Loss:  minimal             Total IV Fluids: 800 mL           Specimens:   Specimens     ID Source Type Tests Collected By Collected At Frozen?    A Gastric, Fundus Tissue · TISSUE PATHOLOGY EXAM   Eloy Dominique MD 1/12/23 0931 No                 Implants:   Implant Name Type Inv. Item Serial No.  Lot No. LRB No. Used Action   HEMOST ABS SURGICEL SNOW 4X4IN - [id number] Implant HEMOST ABS SURGICEL SNOW 4X4IN  [employer] [id number] N/A 1 Implanted   RELOAD STPLR SUREFORM 60 DAVINCI/X/XI 6ROW 2.5 WHT 1P/U - [id number] Implant RELOAD STPLR SUREFORM 60 DAVINCI/X/XI 6ROW 2.5 WHT 1P/U  INTUITIVE [employer]GICAL [id number] N/A 5 Implanted   RELOAD STPLR SUREFORM 60 DAVINCI/X/XI 6ROW 3.5 JUSTIN 1P/U - [id number] Implant RELOAD STPLR SUREFORM 60 DAVINCI/X/XI 6ROW 3.5 JUSTIN 1P/U  INTUITIVE SURGICAL [id number] N/A 1 Implanted              Complications: None           Disposition: PACU - hemodynamically stable.           Condition: stable

## 2023-01-12 NOTE — H&P (VIEW-ONLY)
Patient Care Team:  Lauren Riddle APRN as PCP - General (Family Medicine)    Reason for Visit:  Surgical Weight loss    Subjective      Jennifer Antonio is a pleasant 27 y.o. female and presents with morbid obesity with her Body mass index is 48 kg/m².    She is here for discussion of weight loss options.  She stated she has been with the disease of obesity for year(s).  She stated she suffers from heartburn and morbid obesity due to her weight gain.  She stated that weight loss helps alleviate these symptoms.   She stated that she has tried multiple diet regimens to help with weight loss.  She stated that she has attempted these conservative methods for weight loss without maintaining long term success.  Today she would like to discuss surgical weight loss options such as the Laparoscopic Sleeve Gastrectomy or the Laparoscopic R - Y Gastric Bypass.      Review of Systems  General ROS: negative  Respiratory ROS: no cough, shortness of breath, or wheezing  Cardiovascular ROS: no chest pain or dyspnea on exertion  Gastrointestinal ROS: no abdominal pain, change in bowel habits, or black or bloody stools  positive for - heartburn  Musculoskeletal ROS: positive for - joint pain    History  Past Medical History:   Diagnosis Date   • GERD (gastroesophageal reflux disease)    • Heartburn    • History of transfusion 1997    @ approx. 2 year old; no adverse reaction.   • Obesity    • PONV (postoperative nausea and vomiting)      Past Surgical History:   Procedure Laterality Date   • BARTHOLIN GLAND CYST EXCISION  2018   • CHOLECYSTECTOMY     • ENDOSCOPY N/A 11/04/2022    Procedure: ESOPHAGOGASTRODUODENOSCOPY WITH ANESTHESIA;  Surgeon: Eloy Dominique MD;  Location: [facility] ENDOSCOPY;  Service: General;  Laterality: N/A;  pre heartburn  post normal  Lauren Aggarwal   • SALPINGECTOMY Bilateral      Family History   Problem Relation Age of Onset   • Hypertension Mother    • Diabetes Mother    • Sleep apnea Father     • No Known Problems Sister    • SIDS Sister    • SIDS Sister    • No Known Problems Brother    • Obesity Maternal Grandmother    • Obesity Paternal Grandmother    • Obesity Paternal Grandfather    • Diabetes Brother      Social History     Tobacco Use   • Smoking status: Never   • Smokeless tobacco: Never   Vaping Use   • Vaping Use: Never used   Substance Use Topics   • Alcohol use: Never   • Drug use: Not Currently     E-cigarette/Vaping   • E-cigarette/Vaping Use Never User      E-cigarette/Vaping Substances     Medications Prior to Admission   Medication Sig Dispense Refill Last Dose   • multivitamin with minerals tablet tablet Take 1 tablet by mouth Daily.   Past Week   • pantoprazole (PROTONIX) 40 MG EC tablet Take 40 mg by mouth Daily.   1/12/2023 at 0500   • sucralfate (CARAFATE) 1 g tablet PRN   Past Month   • calcium carbonate (TUMS) 500 MG chewable tablet Chew 1 tablet Daily.   More than a month     Allergies:  Patient has no known allergies.      Current Facility-Administered Medications:   •  ceFAZolin in 0.9% normal saline (ANCEF) IVPB solution 2 g, 2 g, Intravenous, Once, Eloy Dominique MD  •  dextrose (D50W) (25 g/50 mL) IV injection 12.5 g, 12.5 g, Intravenous, PRN, Porfirio Montelongo MD  •  famotidine (PEPCID) injection 20 mg, 20 mg, Intravenous, 60 Min Pre-Op, Porfirio Montelongo MD, 20 mg at 01/12/23 0828  •  fentaNYL citrate (PF) (SUBLIMAZE) injection 25 mcg, 25 mcg, Intravenous, Q5 Min PRN, Porfirio Montelongo MD  •  lactated ringers bolus 500 mL, 500 mL, Intravenous, Once, Eloy Dominique MD, Stopped at 01/12/23 0829  •  lactated ringers infusion 1,000 mL, 1,000 mL, Intravenous, Continuous, Eloy Dominique MD  •  lactated ringers infusion 1,000 mL, 1,000 mL, Intravenous, Continuous, Eloy Dominique MD, Last Rate: 25 mL/hr at 01/12/23 0747, 1,000 mL at 01/12/23 0747  •  lactated ringers infusion, 9 mL/hr, Intravenous, Continuous, Porfirio Montelongo MD,  Last Rate: 9 mL/hr at 01/12/23 0834, 9 mL/hr at 01/12/23 0834  •  lidocaine PF 1% (XYLOCAINE) injection 0.5 mL, 0.5 mL, Intradermal, Once PRN, Eloy Dominique MD  •  lidocaine PF 1% (XYLOCAINE) injection 0.5 mL, 0.5 mL, Injection, Once PRN, Porfirio Montelongo MD  •  midazolam (VERSED) injection 1 mg, 1 mg, Intravenous, Q10 Min PRN, Porfirio Montelongo MD, 1 mg at 01/12/23 0829  •  scopolamine patch 1 mg/72 hr, 1 patch, Transdermal, Continuous, Eloy Dominique MD  •  scopolamine patch 1 mg/72 hr, 1 patch, Transdermal, Once, Porfirio Montelongo MD, 1 patch at 01/12/23 0829  •  sodium chloride 0.9 % flush 10 mL, 10 mL, Intravenous, PRN, Eloy Dominique MD  •  sodium chloride 0.9 % flush 10 mL, 10 mL, Intravenous, Q12H, Porfirio Montelongo MD  •  sodium chloride 0.9 % flush 10 mL, 10 mL, Intravenous, PRN, Porfirio Montelongo MD  •  sodium chloride 0.9 % flush 3 mL, 3 mL, Intravenous, PRN, Eloy Dominique MD    Objective     Vital Signs  Temp:  [97.6 °F (36.4 °C)] 97.6 °F (36.4 °C)  Heart Rate:  [81] 81  Resp:  [18] 18  BP: (133)/(86) 133/86  Body mass index is 48 kg/m².      01/12/23  0704   Weight: 111 kg (244 lb 7.8 oz)       General Appearance:  awake, alert, oriented, in no acute distress  Lungs:  Normal expansion.  Clear to auscultation.  No rales, rhonchi, or wheezing.  Heart:  Heart regular rate and rhythm  Abdomen:  Soft, non-tender, normal bowel sounds; no bruits, organomegaly or masses.  Abnormal shape: obese  Extremities: Extremities warm to touch, pink, with no edema.      Results Review:   I reviewed the patient's new clinical results.        Assessment & Plan   Encounter Diagnoses   Name Primary?   • Class 3 severe obesity due to excess calories with serious comorbidity and body mass index (BMI) of 50.0 to 59.9 in adult (HCC) Yes   • Heartburn        I believe this patient will be a good candidate for weight loss surgery.    She has chosen laparoscopic sleeve  gastrectomy. I agree with this decision.  I have discussed the Maya - Y Gastric Bypass, laparoscopic sleeve gastrectomy and the Laparoscopic Gastric Band procedures to provide the alternatives which includes non surgical weight loss options as well.  We discussed the benefits of the surgeries including the benefit of weight loss and the possible reversal of co-morbid conditions associated with morbid obesity.  She is aware that the Sleeve procedure is not reversible.  We discussed the complications and risks which include the risk of perforation, leakage,bleeding, intra-abdominal organ injury, specifically at high risks of the liver and spleen, stenosis or ulcerations, the risk of venous thrombosis formation in the lungs, mesentery veins or lower extremities  leading to possible organ injury and death.  I explained to the patient that there is a risk of infection.  I explained to her the possibility that this procedure may not be performed laparoscopically and may require being converted to an opened procedure or aborted due to abnormal anatomy.  Also postoperatively there is a risk of increased GERD symptoms after this procedure.  I have explained if she develops intestinal metaplasia of her esophagus consideration for conversion to another weight loss procedure may be necessary.    I have explained to the patient that depression, addictive behaviors and even an increase in suicidal emotions can occur after surgery and even though they have undergone a mental health evaluation through psychology/psychiatry or by a registered therapist she may require additional evaluation and treatment in the future.  Nicotine cessation needs to continue even after their surgical procedure and nicotine products should be avoided due to the side effects of these products.  I explained to Jennifer Antonio not to plan for pregnancy within 12 to 18 months of her procedure.  I explained to her postoperatively she should avoid having  unprotected sex that would increase her risk of pregnancy during the postoperative period of 1 to 1-1/2 years.      I have reviewed with the patient her 30-day mortality risk using the ACS Surgical Risk Calculator to be below average of 0.1%.    I explained to the patient that the success of the surgery is directly related to the motivation and dedication to behavioral changes that they have learned during their preoperative course.  There is data that shows approximately 10% of patients may have satisfactory weight loss or regain their weight they have lost after surgery.  It is more likely due to returning to the previous behaviors they incorporated during their disease of obesity.  I also encourage Jennifer Antonio to incorporate exercise again after surgery weight restrictions have been removed postoperatively.    Froylan Report   As part of this patient's treatment plan I am prescribing controlled substances.  We review Froylan in our educational course.  The patient has been made aware of appropriate use of such medications, including potential risk of somnolence, limited ability to drive and /or work safely, and potential for dependence or overdose. It has also been made clear that these medications are for use by this patient only, without concomitant use of alcohol or other substances unless prescribed.    Jennifer Antonio will be required to complete the educational preoperative class detailing terms of the preoperative and postoperative recommendations, risks of surgery, the monitoring of the patient's FROYLAN reports if necessary. Jennifer Antonio is aware that inappropriate use of prescribed medications will result in cessation of prescribing such medications.    FROYLAN report has been reviewed.      History and physical exam exhibit continued safe and appropriate use of controlled substances.       Jennifer WALLACE Paco understands the surgical procedures and the different surgical options that are available.    Jennifer Antonio understands the lifestyle changes that are required after surgery and has agreed to follow the guidelines outlined in the weight management program. Jennifer Antonio also expressed understanding of the risks involved and had all of her questions answered and desires to proceed.    Upon completion of our discussion and addressing and answering her questions to her satisfaction, informed consent was obtained.   She will be scheduled accordingly for a laparoscopic Sleeve gastrectomy procedure.      I discussed the patient's findings and my recommendations with patient.     I have also recommended that she obtain completion of her preoperative educational course which she has completed, diet regimen and laboratory work prior to surgery consideration.      Dr. Eloy Dominique MD FACS    01/12/23  09:01 CST  Patient Care Team:  Lauren Riddle APRN as PCP - General (Family Medicine)

## 2023-01-13 VITALS
RESPIRATION RATE: 16 BRPM | HEIGHT: 60 IN | OXYGEN SATURATION: 99 % | TEMPERATURE: 98.7 F | WEIGHT: 244.49 LBS | SYSTOLIC BLOOD PRESSURE: 117 MMHG | BODY MASS INDEX: 48 KG/M2 | HEART RATE: 72 BPM | DIASTOLIC BLOOD PRESSURE: 68 MMHG

## 2023-01-13 LAB
CYTO UR: NORMAL
GLUCOSE BLDC GLUCOMTR-MCNC: 100 MG/DL (ref 70–130)
GLUCOSE BLDC GLUCOMTR-MCNC: 83 MG/DL (ref 70–130)
HCT VFR BLD AUTO: 37.5 % (ref 34–46.6)
HGB BLD-MCNC: 11.5 G/DL (ref 12–15.9)
LAB AP CASE REPORT: NORMAL
Lab: NORMAL
PATH REPORT.FINAL DX SPEC: NORMAL
PATH REPORT.GROSS SPEC: NORMAL

## 2023-01-13 PROCEDURE — 82962 GLUCOSE BLOOD TEST: CPT

## 2023-01-13 PROCEDURE — 85014 HEMATOCRIT: CPT | Performed by: SURGERY

## 2023-01-13 PROCEDURE — 99024 POSTOP FOLLOW-UP VISIT: CPT | Performed by: NURSE PRACTITIONER

## 2023-01-13 PROCEDURE — 94799 UNLISTED PULMONARY SVC/PX: CPT

## 2023-01-13 PROCEDURE — 25010000002 CEFAZOLIN PER 500 MG: Performed by: SURGERY

## 2023-01-13 PROCEDURE — 85018 HEMOGLOBIN: CPT | Performed by: SURGERY

## 2023-01-13 PROCEDURE — 25010000002 KETOROLAC TROMETHAMINE PER 15 MG: Performed by: SURGERY

## 2023-01-13 PROCEDURE — 25010000002 ONDANSETRON PER 1 MG: Performed by: SURGERY

## 2023-01-13 PROCEDURE — 25010000002 ENOXAPARIN PER 10 MG: Performed by: SURGERY

## 2023-01-13 RX ORDER — TRAMADOL HYDROCHLORIDE 50 MG/1
50 TABLET ORAL EVERY 8 HOURS PRN
Qty: 30 TABLET | Refills: 0 | Status: SHIPPED | OUTPATIENT
Start: 2023-01-13 | End: 2023-02-13

## 2023-01-13 RX ORDER — ALBUTEROL SULFATE 90 UG/1
1 AEROSOL, METERED RESPIRATORY (INHALATION) EVERY 4 HOURS PRN
COMMUNITY

## 2023-01-13 RX ORDER — SIMETHICONE 80 MG
40 TABLET,CHEWABLE ORAL EVERY 6 HOURS PRN
Qty: 30 TABLET | Refills: 1 | Status: SHIPPED | OUTPATIENT
Start: 2023-01-13 | End: 2023-02-13

## 2023-01-13 RX ORDER — ONDANSETRON 4 MG/1
4 TABLET, ORALLY DISINTEGRATING ORAL EVERY 8 HOURS PRN
Qty: 30 TABLET | Refills: 1 | Status: SHIPPED | OUTPATIENT
Start: 2023-01-13

## 2023-01-13 RX ADMIN — ENOXAPARIN SODIUM 40 MG: 100 INJECTION SUBCUTANEOUS at 08:20

## 2023-01-13 RX ADMIN — FAMOTIDINE 20 MG: 10 INJECTION INTRAVENOUS at 08:20

## 2023-01-13 RX ADMIN — ONDANSETRON 4 MG: 2 INJECTION INTRAMUSCULAR; INTRAVENOUS at 03:48

## 2023-01-13 RX ADMIN — CEFAZOLIN 2 G: 2 INJECTION, POWDER, FOR SOLUTION INTRAMUSCULAR; INTRAVENOUS at 00:30

## 2023-01-13 RX ADMIN — SODIUM CHLORIDE, POTASSIUM CHLORIDE, SODIUM LACTATE AND CALCIUM CHLORIDE 140 ML/HR: 600; 310; 30; 20 INJECTION, SOLUTION INTRAVENOUS at 05:15

## 2023-01-13 RX ADMIN — Medication 10 ML: at 08:21

## 2023-01-13 RX ADMIN — ACETAMINOPHEN 325 MG: 325 SUSPENSION ORAL at 08:20

## 2023-01-13 RX ADMIN — KETOROLAC TROMETHAMINE 30 MG: 30 INJECTION, SOLUTION INTRAMUSCULAR; INTRAVENOUS at 03:48

## 2023-01-13 RX ADMIN — ACETAMINOPHEN 325 MG: 325 SUSPENSION ORAL at 03:48

## 2023-01-13 RX ADMIN — ONDANSETRON 4 MG: 2 INJECTION INTRAMUSCULAR; INTRAVENOUS at 11:07

## 2023-01-13 NOTE — PLAN OF CARE
Goal Outcome Evaluation:  Plan of Care Reviewed With: patient           Outcome Evaluation: Pt. being discharged home today. Lap x7 clean, dry, and intact. Abdominal binder on. Ambulated in hallway and in room. Minimal complaints of pain and no nausea. IV x2 removed. Pt. educated on future medications and Dr. vital. Pt. understands diet to follow. Spouse will be taking patient home today. Safety maintained.

## 2023-01-13 NOTE — PROGRESS NOTES
Patient is postoperative day 1 from a sleeve gastrectomy with da Soila robotic assist.  Overall patient states that she is doing well.  Patient is tolerating her stage I liquid diet and has ambulated in the haynes.  She denies nausea and vomiting at this time.  Patient states pain is well controlled.    Review of Systems   Constitutional: Negative.    Respiratory: Negative.    Cardiovascular: Negative.    Gastrointestinal: Positive for abdominal pain.        Mild tenderness at incision site   Endocrine: Negative.    Musculoskeletal: Negative.    Psychiatric/Behavioral: Negative.          Vitals:    01/12/23 1900 01/12/23 2325 01/13/23 0438 01/13/23 0759   BP: 140/89 126/66 141/57 131/94   BP Location: Left arm Right arm Left arm Left arm   Patient Position: Sitting Lying Sitting Sitting   Pulse: 82 70 68 90   Resp: 18 16 16 16   Temp: 98.6 °F (37 °C) 98.9 °F (37.2 °C) 97.4 °F (36.3 °C) 98.4 °F (36.9 °C)   TempSrc: Oral Oral Oral    SpO2: 99% 96% 95% 94%   Weight:       Height:         Physical Exam  Vitals and nursing note reviewed.   Constitutional:       Appearance: She is obese.      Comments: Sitting upright in chair   Cardiovascular:      Rate and Rhythm: Normal rate and regular rhythm.   Pulmonary:      Effort: Pulmonary effort is normal.   Abdominal:      General: Bowel sounds are normal.      Palpations: Abdomen is soft.      Comments: Lap x6 with Exofin present.  Abdominal binder present   Musculoskeletal:         General: Normal range of motion.      Comments: No calf tenderness bilateral, no lower extremity edema   Skin:     General: Skin is warm and dry.   Neurological:      Mental Status: She is alert and oriented to person, place, and time.   Psychiatric:         Mood and Affect: Mood normal.         Behavior: Behavior normal.     Patient advised to drink 1 ounce every 15 minutes.  she is aware to drink fluids slowly and not to exceed the recommendation for fluid intake.  she is aware of the risk of  diarrhea, constipation, nausea, vomiting, and pain.  she will be discharged with medications to assist with symptoms and verbalizes understanding about when to take the medications and when to call the office.  her exam today was within normal limits for postoperative day 1. she is sitting up in bed and is alert and oriented x3.   Plans are to discharge patient today once Dr. Dominique evaluates patient and determines if this is appropriate.  Patient was given a copy of all discharge instructions, dates to advance her diet, and postoperative appointments.  Patient verbalizes understanding.

## 2023-01-13 NOTE — PLAN OF CARE
Goal Outcome Evaluation:  Plan of Care Reviewed With: patient        Progress: improving  Outcome Evaluation: Lap x7 c/d/i. Abd binder in place. Pt medicated for pain x2. Ambulated in haynes x2 this shift. IV fluids infusing/abx administered. SCDs. VSS. Spouse at bedside. Safety maintained.

## 2023-01-16 ENCOUNTER — TELEPHONE (OUTPATIENT)
Dept: BARIATRICS/WEIGHT MGMT | Facility: CLINIC | Age: 28
End: 2023-01-16
Payer: MEDICAID

## 2023-01-16 NOTE — TELEPHONE ENCOUNTER
PH: [phone]          Patient doing: really good        PROCEDURE INFORMATION:   Date of Procedure: 01/12/2023  Follow up appointment:  01/18/2023     1. Are you tolerating liquids? yes  Reason:      2. How many ounces of liquid are you getting per day? 24+oz     3. Are you ambulating well? yes     4. Do you have nausea or vomiting? no     5. How do your incisions look? Very good                  6. Do you have any concerns?  not at this time

## 2023-01-16 NOTE — PAYOR COMM NOTE
"REF: [id number]    [facility]  [place],   [phone]  OR  FAX   [phone]      Serg Nieves (27 y.o. Female)     Date of Birth   1995    Social Security Number       Address   [address] [facility] [address]    Home Phone   [phone]    MRN   [id number]       [employer]   Other    Marital Status                               Admission Date   1/12/23    Admission Type   Elective    Admitting Provider   Esvin Dominique MD    Attending Provider       Department, Room/Bed   [facility] 3C, 370/1       Discharge Date   1/13/2023    Discharge Disposition   Home or Self Care    Discharge Destination                               Attending Provider: (none)   Allergies: No Known Allergies    Isolation: None   Infection: None   Code Status: Prior    Ht: 152 cm (59.84\")   Wt: 111 kg (244 lb 7.8 oz)    Admission Cmt: None   Principal Problem: Class 3 severe obesity due to excess calories with serious comorbidity and body mass index (BMI) of 50.0 to 59.9 in adult (HCC) [E66.01,Z68.43]                 Active Insurance as of 1/12/2023     Primary Coverage     Payor Plan Insurance Group Employer/Plan Group    [employer]HE[employer] MEDICAID [employer] MEDICAID [id number]     Payor Plan Address Payor Plan Phone Number Payor Plan Fax Number Effective Dates    [address] [address] [phone]  4/1/2022 - None Entered    [facility] [address]       Subscriber Name Subscriber Birth Date Member ID       SERG NIEVES 1995 [id number]                 Emergency Contacts      (Rel.) Home Phone Work Phone Mobile Phone    PacoFacundo (Spouse) [phone] -- --                Discharge Order (From admission, onward)     Start     Ordered    01/13/23 1251  Discharge patient  Once        Expected Discharge Date: 01/13/23    Discharge Disposition: Home or Self Care    Physician of Record for Attribution - Please select from Treatment Team: ESVIN DOMINIQUE [[id number]]    Review " needed by CMO to determine Physician of Record: No       Question Answer Comment   Physician of Record for Attribution - Please select from Treatment Team ESVIN BASS    Review needed by CMO to determine Physician of Record No        01/13/23 1454

## 2023-01-18 ENCOUNTER — OFFICE VISIT (OUTPATIENT)
Dept: BARIATRICS/WEIGHT MGMT | Facility: CLINIC | Age: 28
End: 2023-01-18
Payer: MEDICAID

## 2023-01-18 VITALS
DIASTOLIC BLOOD PRESSURE: 80 MMHG | OXYGEN SATURATION: 97 % | HEIGHT: 60 IN | BODY MASS INDEX: 46.57 KG/M2 | HEART RATE: 85 BPM | WEIGHT: 237.2 LBS | SYSTOLIC BLOOD PRESSURE: 138 MMHG | TEMPERATURE: 98.7 F

## 2023-01-18 DIAGNOSIS — Z98.84 STATUS POST LAPAROSCOPIC SLEEVE GASTRECTOMY: Primary | ICD-10-CM

## 2023-01-18 DIAGNOSIS — E66.01 CLASS 3 SEVERE OBESITY DUE TO EXCESS CALORIES WITH SERIOUS COMORBIDITY AND BODY MASS INDEX (BMI) OF 45.0 TO 49.9 IN ADULT: ICD-10-CM

## 2023-01-18 PROCEDURE — 99024 POSTOP FOLLOW-UP VISIT: CPT | Performed by: SURGERY

## 2023-01-18 NOTE — PROGRESS NOTES
"  Patient Care Team:  Lauren Riddle APRN as PCP - General (Family Medicine)    Subjective   Jennifer Antonio is a 27 y.o. female.     Jennifer is post op 1 week from a sleeve gastrectomy procedure.  She is currently on her stage II diet.  She is consuming at least 30 to 40 ounces of fluid daily as well as 30 to 60 g of protein daily.  She tolerated the advancement of her diet to stage II.  She is continuing to do well and has had a bowel movement.    Review Of Systems:  General ROS: negative  Respiratory ROS: no cough, shortness of breath, or wheezing  Cardiovascular ROS: no chest pain or dyspnea on exertion  Gastrointestinal ROS: no abdominal pain, change in bowel habits, or black or bloody stools    The following portions of the patient's history were reviewed and updated as appropriate: allergies, current medications, past family history, past medical history, past social history, past surgical history and problem list.    Objective   /80 (BP Location: Right arm, Patient Position: Sitting, Cuff Size: Adult)   Pulse 85   Temp 98.7 °F (37.1 °C)   Ht 152.4 cm (60\")   Wt 108 kg (237 lb 3.2 oz)   LMP 12/26/2022   SpO2 97%   BMI 46.32 kg/m²       01/18/23  1323   Weight: 108 kg (237 lb 3.2 oz)        General Appearance:  awake, alert, oriented, in no acute distress  Lungs:  Breathing Pattern: regular, no distress, Breath sounds: wheezing- posterior, Percussion: normal, resonant  Heart:  Heart regular rate and rhythm  Abdomen:  Soft, non-tender, normal bowel sounds; no bruits, organomegaly or masses.  Abnormal shape: obese  Extremities: Extremities warm to touch, pink, with no edema.  Wounds: clean, dry, intact    ASSESSMENT/ PLAN    Encounter Diagnoses   Name Primary?   • Status post laparoscopic sleeve gastrectomy Yes   • Class 3 severe obesity due to excess calories with serious comorbidity and body mass index (BMI) of 45.0 to 49.9 in adult ([facility])      She may continue her multivitamins and advance " accordingly to our recommendations.  She was encouraged to increase her fluids to 64 ounces as a goal.  The patient and I discussed their weight restrictions which is defined as avoid lifting greater than 15 lbs for at least 4 weeks to allow healing of her tissue.  I also discussed the avoidance of driving or operating a motor vehicle if she requires, needs taking pain medication, or has a distracting injury or pain because of the risk of injuring herself or others.  The patient may advance diet as directed.    01/18/23  13:59 CST  Patient Care Team:  Lauren Riddle APRN as PCP - General (Family Medicine)  Eloy Dominique MD FACS    Answers for HPI/ROS submitted by the patient on 1/13/2023  Please describe your symptoms.: Surgial site pain  Have you had these symptoms before?: Yes  How long have you been having these symptoms?: 5-7 days  What is the primary reason for your visit?: Other

## 2023-02-13 ENCOUNTER — OFFICE VISIT (OUTPATIENT)
Dept: BARIATRICS/WEIGHT MGMT | Facility: CLINIC | Age: 28
End: 2023-02-13
Payer: MEDICAID

## 2023-02-13 VITALS
OXYGEN SATURATION: 97 % | DIASTOLIC BLOOD PRESSURE: 72 MMHG | WEIGHT: 231.4 LBS | BODY MASS INDEX: 45.43 KG/M2 | TEMPERATURE: 98.4 F | HEIGHT: 60 IN | SYSTOLIC BLOOD PRESSURE: 122 MMHG | HEART RATE: 70 BPM

## 2023-02-13 DIAGNOSIS — E66.01 CLASS 3 SEVERE OBESITY DUE TO EXCESS CALORIES WITH SERIOUS COMORBIDITY AND BODY MASS INDEX (BMI) OF 45.0 TO 49.9 IN ADULT: ICD-10-CM

## 2023-02-13 DIAGNOSIS — Z98.84 STATUS POST LAPAROSCOPIC SLEEVE GASTRECTOMY: Primary | ICD-10-CM

## 2023-02-13 PROCEDURE — 99024 POSTOP FOLLOW-UP VISIT: CPT | Performed by: SURGERY

## 2023-02-13 NOTE — PROGRESS NOTES
"  Patient Care Team:  Lauren Riddle APRN as PCP - General (Family Medicine)    Subjective   Jennifer Antonio is a 27 y.o. female.     Jennifer is post op 1 month from her sleeve gastrectomy procedure.  She is currently on her regular diet.  She states she is consuming approximately 70 ounces of fluid and 70 g protein daily.  She exercises in the form of walking for 30 minutes 3 times a week.  She states she is measuring her meals portion sizes no greater than a half a cup and eating these meals 4 times a day.    Review Of Systems:  General ROS: negative  Respiratory ROS: no cough, shortness of breath, or wheezing  Cardiovascular ROS: no chest pain or dyspnea on exertion  Gastrointestinal ROS: no abdominal pain, change in bowel habits, or black or bloody stools    The following portions of the patient's history were reviewed and updated as appropriate: allergies, current medications, past family history, past medical history, past social history, past surgical history and problem list.    Objective   /72 (BP Location: Right arm, Patient Position: Sitting, Cuff Size: Adult)   Pulse 70   Temp 98.4 °F (36.9 °C)   Ht 152.4 cm (60\")   Wt 105 kg (231 lb 6.4 oz)   SpO2 97%   BMI 45.19 kg/m²       02/13/23  0912   Weight: 105 kg (231 lb 6.4 oz)        General Appearance:  awake, alert, oriented, in no acute distress  Abdomen:  Soft, non-tender, normal bowel sounds; no bruits, organomegaly or masses.  Abnormal shape: obese  Wounds: clean, dry, intact    ASSESSMENT/ PLAN    Encounter Diagnoses   Name Primary?   • Status post laparoscopic sleeve gastrectomy Yes   • Class 3 severe obesity due to excess calories with serious comorbidity and body mass index (BMI) of 45.0 to 49.9 in adult ([facility])      Jennifer Antonio and I discussed the importance of changing behavior for consistent and successful weight loss.  We first reviewed again the definition of a meal which is defined as portion sizes less than a half a " cup and those portions incorporating a vegetable.  Specifically the vegetable should fill half of that volume.  I also explained that she should attempt to consume 4 meals as defined above daily and to avoid snacking or grazing.  She should also be mindful of adequate hydration by consuming at least 64 oz of water daily and incorporation of a regular exercise regimen.     I discussed the importance of taking her multivitamins as directed.  We discussed the importance to be aware of foods that might have an addictive component with their behavior.  Those that are considered addictive based off of how they are consumed should be avoided.  Specifically if there is limited nutritional value and the food choice it should be avoided and substituted with something that would provide more nutrition.    She is to return to our office 2 months or as needed.      02/13/23  09:33 CST  Patient Care Team:  Lauren Riddle APRN as PCP - General (Family Medicine)  Eloy Dominique MD FACS    Answers for HPI/ROS submitted by the patient on 2/13/2023  Please describe your symptoms.: Follow up  Have you had these symptoms before?: No  How long have you been having these symptoms?: Greater than 2 weeks  What is the primary reason for your visit?: Other

## 2023-04-17 ENCOUNTER — OFFICE VISIT (OUTPATIENT)
Dept: BARIATRICS/WEIGHT MGMT | Facility: CLINIC | Age: 28
End: 2023-04-17
Payer: MEDICAID

## 2023-04-17 VITALS
BODY MASS INDEX: 43.23 KG/M2 | WEIGHT: 220.2 LBS | OXYGEN SATURATION: 96 % | TEMPERATURE: 98.2 F | HEART RATE: 69 BPM | SYSTOLIC BLOOD PRESSURE: 136 MMHG | DIASTOLIC BLOOD PRESSURE: 86 MMHG | HEIGHT: 60 IN

## 2023-04-17 DIAGNOSIS — F32.A DEPRESSION, UNSPECIFIED DEPRESSION TYPE: Primary | ICD-10-CM

## 2023-04-17 DIAGNOSIS — Z98.84 STATUS POST LAPAROSCOPIC SLEEVE GASTRECTOMY: ICD-10-CM

## 2023-04-17 PROCEDURE — 99213 OFFICE O/P EST LOW 20 MIN: CPT | Performed by: SURGERY

## 2023-04-17 PROCEDURE — 1159F MED LIST DOCD IN RCRD: CPT | Performed by: SURGERY

## 2023-04-17 PROCEDURE — 1160F RVW MEDS BY RX/DR IN RCRD: CPT | Performed by: SURGERY

## 2023-04-17 NOTE — PROGRESS NOTES
"  Patient Care Team:  Lauren Riddle APRN as PCP - General (Family Medicine)    Subjective     Jennifer Antonio is a 27 y.o. female.     Jennifer is post op 3 month from her sleeve gastrectomy procedure.  She is currently on her regular diet.  She is struggling with maintaining 4 meals a day consistently due to stressors.  She does consume at least 60 ounces of fluid and walks regularly.  She still maintains half cup portion sizes per her statement.    Review Of Systems:  General ROS: positive for  - fatigue  Psychological ROS: positive for - depression  Gastrointestinal ROS: no abdominal pain, change in bowel habits, or black or bloody stools    The following portions of the patient's history were reviewed and updated as appropriate: allergies, current medications, past family history, past medical history, past social history, past surgical history and problem list.    Objective   /86 (BP Location: Right arm, Patient Position: Sitting, Cuff Size: Adult)   Pulse 69   Temp 98.2 °F (36.8 °C)   Ht 152.4 cm (60\")   Wt 99.9 kg (220 lb 3.2 oz)   SpO2 96%   BMI 43.00 kg/m²       04/17/23  0911   Weight: 99.9 kg (220 lb 3.2 oz)        General Appearance:  awake, alert, oriented, in no acute distress  Abdomen:  Soft, non-tender, normal bowel sounds; no bruits, organomegaly or masses.  Abnormal shape: obese    ASSESSMENT/ PLAN    Encounter Diagnoses   Name Primary?   • Depression, unspecified depression type Yes   • Status post laparoscopic sleeve gastrectomy      Jennifer Antonio and I discussed the importance of changing behavior for consistent and successful weight loss.  We first reviewed again the definition of a meal which is defined as portion sizes less than a half a cup and those portions incorporating a vegetable.  Specifically the vegetable should fill half of that volume.  I also explained that she should attempt to consume 4 meals as defined above daily and to avoid snacking or grazing.  She " should also be mindful of adequate hydration by consuming at least 64 oz of water daily and incorporation of a regular exercise regimen.     I discussed the importance of taking her multivitamins as directed.  We discussed the importance to be aware of foods that might have an addictive component with their behavior.  Those that are considered addictive based off of how they are consumed should be avoided.  Specifically if there is limited nutritional value and the food choice it should be avoided and substituted with something that would provide more nutrition.    She is to return to our office to months or as needed.  I also put a consultation in with psychology due to the patient's stressors and concerns of emotional eating.  I wanted to have the patient armed with options and coping skills to help her through these difficult stress for times.  The patient was open and accepting of this.  She will visit with psychology and follow-up with us in 2 months.      04/17/23  09:32 CDT  Patient Care Team:  Lauren Riddle APRN as PCP - General (Family Medicine)  Eloy Dominique MD FACS

## 2024-07-30 ENCOUNTER — TELEPHONE (OUTPATIENT)
Dept: BARIATRICS/WEIGHT MGMT | Facility: CLINIC | Age: 29
End: 2024-07-30
Payer: MEDICAID

## 2024-07-30 NOTE — TELEPHONE ENCOUNTER
Called and left message for pt to call office regarding scheduling annual post op appointment. Will also send letter.

## (undated) DEVICE — ENDOGATOR AUXILIARY WATER JET CONNECTOR: Brand: ENDOGATOR

## (undated) DEVICE — SEAL

## (undated) DEVICE — VISIGI 3D®  CALIBRATION SYSTEM  SIZE 40FR STD W/ BULB: Brand: BOEHRINGER® VISIGI 3D™ SLEEVE GASTRECTOMY CALIBRATION SYSTEM, SIZE 40FR W/BULB

## (undated) DEVICE — SUT VIC 0 SUTUPAK TIES 18IN J906G

## (undated) DEVICE — VESSEL SEALER EXTEND: Brand: ENDOWRIST

## (undated) DEVICE — CANNULA SEAL

## (undated) DEVICE — SENSR O2 OXIMAX FNGR A/ 18IN NONSTR

## (undated) DEVICE — SYS CLOSE PORTII CARTR/THOMASN XL

## (undated) DEVICE — CUFF,BP,DISP,1 TUBE,ADULT,HP: Brand: MEDLINE

## (undated) DEVICE — FRCP BX RADJAW4 NDL 2.8 240 STD OG

## (undated) DEVICE — CONMED SCOPE SAVER BITE BLOCK, 20X27 MM: Brand: SCOPE SAVER

## (undated) DEVICE — REDUCER: Brand: ENDOWRIST

## (undated) DEVICE — SYR LL TP 10ML STRL

## (undated) DEVICE — KT CLN CLEANOR SCPE

## (undated) DEVICE — ARM DRAPE

## (undated) DEVICE — TBG SMPL FLTR LINE NASL 02/C02 A/ BX/100

## (undated) DEVICE — BLADELESS OBTURATOR: Brand: WECK VISTA

## (undated) DEVICE — DAVINCI: Brand: MEDLINE INDUSTRIES, INC.

## (undated) DEVICE — STAPLER 60: Brand: SUREFORM

## (undated) DEVICE — BAPTIST TURNOVER KIT: Brand: MEDLINE INDUSTRIES, INC.

## (undated) DEVICE — Device: Brand: DEFENDO AIR/WATER/SUCTION AND BIOPSY VALVE

## (undated) DEVICE — ADHS SKIN PREMIERPRO EXOFIN TOPICAL HI/VISC .5ML

## (undated) DEVICE — ST TBG AIRSEAL FLTR TRI LUM

## (undated) DEVICE — SUT VIC 3/0 SH 36IN VCP527H

## (undated) DEVICE — SUT MNCRYL 4/0 PS2 27IN UD MCP426H

## (undated) DEVICE — GLV SURG SENSICARE W/ALOE PF LF 8 STRL

## (undated) DEVICE — BANDAGE,GAUZE,BULKEE II,4.5"X4.1YD,STRL: Brand: MEDLINE

## (undated) DEVICE — THE CHANNEL CLEANING BRUSH IS A NYLON FLEXI BRUSH ATTACHED TO A FLEXIBLE PLASTIC SHEATH DESIGNED TO SAFELY REMOVE DEBRIS FROM FLEXIBLE ENDOSCOPES.

## (undated) DEVICE — DRN PENRS RO SILCNE 1/4X12IN LF STRL

## (undated) DEVICE — MAT PREVALON MOBL TRANSFR AIR W/PAD REPROC 39X81IN

## (undated) DEVICE — BNDR ABD 4PANEL 12IN 63 TO 74IN

## (undated) DEVICE — ENDOPATH XCEL WITH OPTIVIEW TECHNOLOGY BLADELESS TROCARS WITH STABILITY SLEEVES: Brand: ENDOPATH XCEL OPTIVIEW

## (undated) DEVICE — TROC ANCHORPORT BLADELES LP 8X120MM

## (undated) DEVICE — NDL HYPO PRECISIONGLIDE REG 22G 1 1/2